# Patient Record
Sex: FEMALE | Race: WHITE | NOT HISPANIC OR LATINO | Employment: UNEMPLOYED | URBAN - METROPOLITAN AREA
[De-identification: names, ages, dates, MRNs, and addresses within clinical notes are randomized per-mention and may not be internally consistent; named-entity substitution may affect disease eponyms.]

---

## 2021-01-27 PROBLEM — R19.7 NOCTURNAL DIARRHEA: Status: ACTIVE | Noted: 2021-01-27

## 2021-01-27 PROBLEM — R10.11 RIGHT UPPER QUADRANT ABDOMINAL PAIN: Status: ACTIVE | Noted: 2021-01-27

## 2021-01-27 PROBLEM — R63.4 UNINTENTIONAL WEIGHT LOSS: Status: ACTIVE | Noted: 2021-01-27

## 2021-01-27 PROBLEM — R19.7 DIARRHEA: Status: ACTIVE | Noted: 2021-01-27

## 2021-07-01 ENCOUNTER — PATIENT MESSAGE (OUTPATIENT)
Dept: ADMINISTRATIVE | Facility: OTHER | Age: 21
End: 2021-07-01

## 2022-11-17 DIAGNOSIS — D64.9 ANEMIA: Primary | ICD-10-CM

## 2022-11-28 ENCOUNTER — OFFICE VISIT (OUTPATIENT)
Dept: HEMATOLOGY/ONCOLOGY | Facility: CLINIC | Age: 22
End: 2022-11-28
Payer: COMMERCIAL

## 2022-11-28 ENCOUNTER — TELEPHONE (OUTPATIENT)
Dept: HEMATOLOGY/ONCOLOGY | Facility: CLINIC | Age: 22
End: 2022-11-28
Payer: MEDICAID

## 2022-11-28 VITALS
SYSTOLIC BLOOD PRESSURE: 116 MMHG | TEMPERATURE: 98 F | WEIGHT: 181 LBS | OXYGEN SATURATION: 98 % | HEIGHT: 64 IN | BODY MASS INDEX: 30.9 KG/M2 | HEART RATE: 74 BPM | DIASTOLIC BLOOD PRESSURE: 80 MMHG

## 2022-11-28 DIAGNOSIS — D50.8 OTHER IRON DEFICIENCY ANEMIA: Primary | ICD-10-CM

## 2022-11-28 DIAGNOSIS — D50.9 IRON DEFICIENCY ANEMIA, UNSPECIFIED IRON DEFICIENCY ANEMIA TYPE: ICD-10-CM

## 2022-11-28 DIAGNOSIS — D50.9 IRON DEFICIENCY ANEMIA, UNSPECIFIED IRON DEFICIENCY ANEMIA TYPE: Primary | ICD-10-CM

## 2022-11-28 DIAGNOSIS — R79.1 ELEVATED PARTIAL THROMBOPLASTIN TIME (PTT): ICD-10-CM

## 2022-11-28 PROBLEM — D64.9 ANEMIA: Status: ACTIVE | Noted: 2022-11-28

## 2022-11-28 LAB
ALBUMIN SERPL-MCNC: 4.3 GM/DL (ref 3.5–5)
ALBUMIN/GLOB SERPL: 1.4 RATIO (ref 1.1–2)
ALP SERPL-CCNC: 83 UNIT/L (ref 40–150)
ALT SERPL-CCNC: 6 UNIT/L (ref 0–55)
APTT PPP: 39.1 SECONDS (ref 23.2–33.7)
AST SERPL-CCNC: 10 UNIT/L (ref 5–34)
BASOPHILS # BLD AUTO: 0.04 X10(3)/MCL (ref 0–0.2)
BASOPHILS NFR BLD AUTO: 0.5 %
BILIRUBIN DIRECT+TOT PNL SERPL-MCNC: 0.3 MG/DL
BUN SERPL-MCNC: 11.3 MG/DL (ref 7–18.7)
CALCIUM SERPL-MCNC: 9.3 MG/DL (ref 8.4–10.2)
CHLORIDE SERPL-SCNC: 107 MMOL/L (ref 98–107)
CLOSURE TME COLL+ADP BLD: 123 SECONDS (ref 46–119)
CLOSURE TME COLL+EPINEP BLD: 162 SECONDS (ref 68–183)
CO2 SERPL-SCNC: 24 MMOL/L (ref 22–29)
CREAT SERPL-MCNC: 0.84 MG/DL (ref 0.55–1.02)
EOSINOPHIL # BLD AUTO: 0.32 X10(3)/MCL (ref 0–0.9)
EOSINOPHIL NFR BLD AUTO: 3.8 %
ERYTHROCYTE [DISTWIDTH] IN BLOOD BY AUTOMATED COUNT: 14.2 % (ref 11.5–17)
FERRITIN SERPL-MCNC: <1.98 NG/ML (ref 4.63–204)
FOLATE SERPL-MCNC: 10.7 NG/ML (ref 7–31.4)
GFR SERPLBLD CREATININE-BSD FMLA CKD-EPI: >60 MLS/MIN/1.73/M2
GLOBULIN SER-MCNC: 3.1 GM/DL (ref 2.4–3.5)
GLUCOSE SERPL-MCNC: 81 MG/DL (ref 74–100)
HCT VFR BLD AUTO: 33.8 % (ref 37–47)
HGB BLD-MCNC: 10.3 GM/DL (ref 12–16)
IMM GRANULOCYTES # BLD AUTO: 0.01 X10(3)/MCL (ref 0–0.04)
IMM GRANULOCYTES NFR BLD AUTO: 0.1 %
INR BLD: 1.01 (ref 0–1.3)
IRON SATN MFR SERPL: 8 % (ref 20–50)
IRON SERPL-MCNC: 32 UG/DL (ref 50–170)
LYMPHOCYTES # BLD AUTO: 3.35 X10(3)/MCL (ref 0.6–4.6)
LYMPHOCYTES NFR BLD AUTO: 39.9 %
MCH RBC QN AUTO: 25.2 PG (ref 27–31)
MCHC RBC AUTO-ENTMCNC: 30.5 MG/DL (ref 33–36)
MCV RBC AUTO: 82.8 FL (ref 80–94)
MONOCYTES # BLD AUTO: 0.4 X10(3)/MCL (ref 0.1–1.3)
MONOCYTES NFR BLD AUTO: 4.8 %
NEUTROPHILS # BLD AUTO: 4.3 X10(3)/MCL (ref 2.1–9.2)
NEUTROPHILS NFR BLD AUTO: 50.9 %
PLATELET # BLD AUTO: 324 X10(3)/MCL (ref 130–400)
PMV BLD AUTO: 8.2 FL (ref 7.4–10.4)
POTASSIUM SERPL-SCNC: 4.2 MMOL/L (ref 3.5–5.1)
PROT SERPL-MCNC: 7.4 GM/DL (ref 6.4–8.3)
PROTHROMBIN TIME: 13.2 SECONDS (ref 12.5–14.5)
RBC # BLD AUTO: 4.08 X10(6)/MCL (ref 4.2–5.4)
RET# (OHS): 0.08 (ref 0.02–0.08)
RETICULOCYTE COUNT AUTOMATED (OLG): 1.89 % (ref 1.1–2.1)
SODIUM SERPL-SCNC: 139 MMOL/L (ref 136–145)
TIBC SERPL-MCNC: 355 UG/DL (ref 70–310)
TIBC SERPL-MCNC: 387 UG/DL (ref 250–450)
VIT B12 SERPL-MCNC: 337 PG/ML (ref 213–816)
WBC # SPEC AUTO: 8.4 X10(3)/MCL (ref 4.5–11.5)

## 2022-11-28 PROCEDURE — 99999 PR PBB SHADOW E&M-EST. PATIENT-LVL IV: ICD-10-PCS | Mod: PBBFAC,,, | Performed by: INTERNAL MEDICINE

## 2022-11-28 PROCEDURE — 1159F MED LIST DOCD IN RCRD: CPT | Mod: CPTII,S$GLB,, | Performed by: INTERNAL MEDICINE

## 2022-11-28 PROCEDURE — 3008F BODY MASS INDEX DOCD: CPT | Mod: CPTII,S$GLB,, | Performed by: INTERNAL MEDICINE

## 2022-11-28 PROCEDURE — 83540 ASSAY OF IRON: CPT | Performed by: INTERNAL MEDICINE

## 2022-11-28 PROCEDURE — 85610 PROTHROMBIN TIME: CPT | Performed by: INTERNAL MEDICINE

## 2022-11-28 PROCEDURE — 3079F DIAST BP 80-89 MM HG: CPT | Mod: CPTII,S$GLB,, | Performed by: INTERNAL MEDICINE

## 2022-11-28 PROCEDURE — 3008F PR BODY MASS INDEX (BMI) DOCUMENTED: ICD-10-PCS | Mod: CPTII,S$GLB,, | Performed by: INTERNAL MEDICINE

## 2022-11-28 PROCEDURE — 36415 COLL VENOUS BLD VENIPUNCTURE: CPT | Performed by: INTERNAL MEDICINE

## 2022-11-28 PROCEDURE — 1159F PR MEDICATION LIST DOCUMENTED IN MEDICAL RECORD: ICD-10-PCS | Mod: CPTII,S$GLB,, | Performed by: INTERNAL MEDICINE

## 2022-11-28 PROCEDURE — 3074F SYST BP LT 130 MM HG: CPT | Mod: CPTII,S$GLB,, | Performed by: INTERNAL MEDICINE

## 2022-11-28 PROCEDURE — 85045 AUTOMATED RETICULOCYTE COUNT: CPT | Performed by: INTERNAL MEDICINE

## 2022-11-28 PROCEDURE — 3074F PR MOST RECENT SYSTOLIC BLOOD PRESSURE < 130 MM HG: ICD-10-PCS | Mod: CPTII,S$GLB,, | Performed by: INTERNAL MEDICINE

## 2022-11-28 PROCEDURE — 99999 PR PBB SHADOW E&M-EST. PATIENT-LVL IV: CPT | Mod: PBBFAC,,, | Performed by: INTERNAL MEDICINE

## 2022-11-28 PROCEDURE — 99204 OFFICE O/P NEW MOD 45 MIN: CPT | Mod: S$GLB,,, | Performed by: INTERNAL MEDICINE

## 2022-11-28 PROCEDURE — 1160F PR REVIEW ALL MEDS BY PRESCRIBER/CLIN PHARMACIST DOCUMENTED: ICD-10-PCS | Mod: CPTII,S$GLB,, | Performed by: INTERNAL MEDICINE

## 2022-11-28 PROCEDURE — 85025 COMPLETE CBC W/AUTO DIFF WBC: CPT | Performed by: INTERNAL MEDICINE

## 2022-11-28 PROCEDURE — 99214 OFFICE O/P EST MOD 30 MIN: CPT | Mod: PBBFAC | Performed by: INTERNAL MEDICINE

## 2022-11-28 PROCEDURE — 3079F PR MOST RECENT DIASTOLIC BLOOD PRESSURE 80-89 MM HG: ICD-10-PCS | Mod: CPTII,S$GLB,, | Performed by: INTERNAL MEDICINE

## 2022-11-28 PROCEDURE — 82746 ASSAY OF FOLIC ACID SERUM: CPT | Performed by: INTERNAL MEDICINE

## 2022-11-28 PROCEDURE — 99204 PR OFFICE/OUTPT VISIT, NEW, LEVL IV, 45-59 MIN: ICD-10-PCS | Mod: S$GLB,,, | Performed by: INTERNAL MEDICINE

## 2022-11-28 PROCEDURE — 80053 COMPREHEN METABOLIC PANEL: CPT | Performed by: INTERNAL MEDICINE

## 2022-11-28 PROCEDURE — 85576 BLOOD PLATELET AGGREGATION: CPT | Performed by: INTERNAL MEDICINE

## 2022-11-28 PROCEDURE — 1160F RVW MEDS BY RX/DR IN RCRD: CPT | Mod: CPTII,S$GLB,, | Performed by: INTERNAL MEDICINE

## 2022-11-28 PROCEDURE — 85730 THROMBOPLASTIN TIME PARTIAL: CPT | Performed by: INTERNAL MEDICINE

## 2022-11-28 PROCEDURE — 82607 VITAMIN B-12: CPT | Performed by: INTERNAL MEDICINE

## 2022-11-28 PROCEDURE — 82728 ASSAY OF FERRITIN: CPT | Performed by: INTERNAL MEDICINE

## 2022-11-28 RX ORDER — SODIUM CHLORIDE 9 MG/ML
INJECTION, SOLUTION INTRAVENOUS CONTINUOUS
Status: CANCELLED | OUTPATIENT
Start: 2022-12-02

## 2022-11-28 RX ORDER — OMEPRAZOLE 40 MG/1
CAPSULE, DELAYED RELEASE ORAL
COMMUNITY

## 2022-11-28 RX ORDER — SODIUM CHLORIDE 0.9 % (FLUSH) 0.9 %
10 SYRINGE (ML) INJECTION
Status: CANCELLED | OUTPATIENT
Start: 2022-12-02

## 2022-11-28 RX ORDER — HEPARIN 100 UNIT/ML
5 SYRINGE INTRAVENOUS
Status: CANCELLED | OUTPATIENT
Start: 2022-12-02

## 2022-11-28 RX ORDER — FLUTICASONE PROPIONATE 50 MCG
2 SPRAY, SUSPENSION (ML) NASAL
COMMUNITY
Start: 2022-09-06 | End: 2023-02-23

## 2022-11-28 NOTE — PROGRESS NOTES
Subjective:        PATIENT: Ceci Abbott  MRN: [id number]  DATE: 2022  Chief Complaint: Follow-up (New Patient)  Referring group:  Nani Antony  Reason for referral anemia     Current Therapy: oral iron and prenatal vit    2022  This is a 22-year-old female who was seen by her primary care team  On laboratory review on 2022 patients have an iron of 27 saturation of 7, a ferritin of 4.8, her hemoglobin was 10, the platelet count of 390, white cell count of 10.  She is noted to have a normal BUN and creatinine.  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  She has dysfunctional uterine bleeding where she is been through multiple different options.  She continues to have PICA for ice, she has chronic heartburn and reflux.  In fact she had a scope when she was younger because of chronic reflux.    She is scheduled to see Gastroenterology.    She continues to breastfeed.  (the baby is 16 months)    Past Medical History:   Diagnosis Date    Depression     Hearing loss in left ear       .  Past Surgical History:   Procedure Laterality Date    ADENOIDECTOMY       SECTION      x2    eardrum reconstruction      SINUS SURGERY      TONSILLECTOMY        .  Family History   Problem Relation Age of Onset    Cancer Mother     Depression Mother     Hypertension Mother     Kidney disease Mother     Miscarriages / Stillbirths Mother     Arthritis Father     Hearing loss Maternal Grandfather     Heart disease Maternal Grandfather     Hypertension Maternal Grandfather     Arthritis Maternal Grandmother     Diabetes Maternal Grandmother     Drug abuse Maternal Grandmother       Social History     Socioeconomic History    Marital status:    Tobacco Use    Smoking  status: Never    Smokeless tobacco: Never   Substance and Sexual Activity    Alcohol use: Never    Drug use: Never    Sexual activity: Yes     Partners: Male     Birth control/protection: OCP      .  Review of patient's allergies indicates:   Allergen Reactions    Shellfish containing products         Current Outpatient Medications:     fluticasone propionate (FLONASE) 50 mcg/actuation nasal spray, 2 sprays by Each Nostril route as needed., Disp: , Rfl:     norethindrone (MICRONOR) 0.35 mg tablet, Take 1 tablet by mouth once daily., Disp: , Rfl:     omeprazole (PRILOSEC) 40 MG capsule, , Disp: , Rfl:     propranoloL (INDERAL) 10 MG tablet, Take 10 mg by mouth once daily., Disp: , Rfl:     sertraline (ZOLOFT) 100 MG tablet, Take 100 mg by mouth once daily., Disp: , Rfl:    Review of Systems   Constitutional:  Negative for appetite change and unexpected weight change.   HENT:  Negative for mouth sores.    Eyes:  Negative for visual disturbance.   Respiratory:  Negative for cough and shortness of breath.    Cardiovascular:  Positive for chest pain.   Gastrointestinal:  Negative for abdominal pain and diarrhea.   Genitourinary:  Negative for frequency.   Musculoskeletal:  Negative for back pain.   Skin:  Negative for rash.   Neurological:  Positive for headaches.   Hematological:  Negative for adenopathy.   Psychiatric/Behavioral:  The patient is not nervous/anxious.         Objective:     Vitals:    11/28/22 1013   BP: 116/80   Pulse: 74   Temp: 98.2 °F (36.8 °C)         Physical Exam  Vitals reviewed.   Constitutional:       General: She is awake.      Appearance: Normal appearance. She is not ill-appearing or diaphoretic.      Comments: Pale   HENT:      Head: Normocephalic and atraumatic.      Right Ear: Tympanic membrane normal.      Left Ear: Tympanic membrane normal.      Mouth/Throat:      Mouth: Mucous membranes are moist.      Pharynx: Oropharynx is clear.   Eyes:      Extraocular Movements: Extraocular  movements intact.      Pupils: Pupils are equal, round, and reactive to light.      Comments: Palor   Cardiovascular:      Rate and Rhythm: Normal rate and regular rhythm.      Pulses: Normal pulses.      Heart sounds: Normal heart sounds.   Pulmonary:      Effort: Pulmonary effort is normal.      Breath sounds: Normal breath sounds and air entry.   Chest:   Breasts:     Right: Normal.      Left: Normal.   Abdominal:      General: Abdomen is flat. Bowel sounds are normal.      Palpations: Abdomen is soft.      Tenderness: There is no abdominal tenderness.   Musculoskeletal:      Cervical back: Normal range of motion.      Right lower leg: No edema.      Left lower leg: No edema.   Lymphadenopathy:      Cervical: No cervical adenopathy.      Upper Body:      Right upper body: No axillary adenopathy.      Left upper body: No axillary adenopathy.      Lower Body: No right inguinal adenopathy. No left inguinal adenopathy.   Skin:     General: Skin is warm and dry.   Neurological:      General: No focal deficit present.      Mental Status: She is alert, oriented to person, place, and time and easily aroused. Mental status is at baseline.   Psychiatric:         Attention and Perception: Attention normal.         Mood and Affect: Mood and affect normal.         Behavior: Behavior is cooperative.       ECOG SCORE    0 - Fully active-able to carry on all pre-disease performance without restriction        .Lab Review:  Outside labs reviewed above in HPI  Assessment/Plan:     Problem List Items Addressed This Visit          Oncology    Anemia    Relevant Orders    CBC Auto Differential    Comprehensive Metabolic Panel    Ferritin    Folate    Iron and TIBC    Reticulocytes    Vitamin B12    Protime-INR    APTT    Platelet Function Assay    Iron deficiency anemia - Primary    Current Assessment & Plan     Iron deficiency: Refractory to prenatal and oral iron     Risk of IV iron infusions discussed --understands to dump her  breast milk, and discussed with gyn,    Continue prenatal vitamin   Discontinue oral iron   Iron rich diet   Agree with GI consultation (she has a follow-up in [place] already scheduled)  Continue follow-up with gyn to control her cycles   Check PT PTT and platelet function studies, if abnormal consider other workup   Labs today CBC, CMP, iron panel, B12 folate, PT PTT, platelet function   Return to clinic in 6-8 weeks with CBC iron panel, ferritin         Relevant Orders    CBC Auto Differential    Comprehensive Metabolic Panel    Ferritin    Folate    Iron and TIBC    Reticulocytes    Vitamin B12    Protime-INR    APTT    Platelet Function Assay         Follow up in about 8 weeks (around 1/23/2023).    Henrik Martínez MD

## 2022-11-28 NOTE — ASSESSMENT & PLAN NOTE
Iron deficiency: Refractory to prenatal and oral iron     Risk of IV iron infusions discussed --understands to dump her breast milk, and discussed with gyn,    Continue prenatal vitamin   Discontinue oral iron   Iron rich diet   Agree with GI consultation (she has a follow-up in [place] already scheduled)  Continue follow-up with gyn to control her cycles   Check PT PTT and platelet function studies, if abnormal consider other workup   Labs today CBC, CMP, iron panel, B12 folate, PT PTT, platelet function   Return to clinic in 6-8 weeks with CBC iron panel, ferritin

## 2022-12-01 ENCOUNTER — PATIENT MESSAGE (OUTPATIENT)
Dept: HEMATOLOGY/ONCOLOGY | Facility: CLINIC | Age: 22
End: 2022-12-01
Payer: MEDICAID

## 2022-12-05 ENCOUNTER — PATIENT MESSAGE (OUTPATIENT)
Dept: HEMATOLOGY/ONCOLOGY | Facility: CLINIC | Age: 22
End: 2022-12-05
Payer: MEDICAID

## 2022-12-05 ENCOUNTER — INFUSION (OUTPATIENT)
Dept: INFUSION THERAPY | Facility: HOSPITAL | Age: 22
End: 2022-12-05
Attending: INTERNAL MEDICINE
Payer: COMMERCIAL

## 2022-12-05 VITALS
OXYGEN SATURATION: 99 % | DIASTOLIC BLOOD PRESSURE: 67 MMHG | SYSTOLIC BLOOD PRESSURE: 122 MMHG | HEART RATE: 70 BPM | TEMPERATURE: 98 F | RESPIRATION RATE: 18 BRPM

## 2022-12-05 DIAGNOSIS — D50.9 IRON DEFICIENCY ANEMIA, UNSPECIFIED IRON DEFICIENCY ANEMIA TYPE: Primary | ICD-10-CM

## 2022-12-05 PROCEDURE — 96365 THER/PROPH/DIAG IV INF INIT: CPT

## 2022-12-05 PROCEDURE — 25000003 PHARM REV CODE 250: Performed by: INTERNAL MEDICINE

## 2022-12-05 PROCEDURE — 96366 THER/PROPH/DIAG IV INF ADDON: CPT

## 2022-12-05 PROCEDURE — 96367 TX/PROPH/DG ADDL SEQ IV INF: CPT

## 2022-12-05 PROCEDURE — 63600175 PHARM REV CODE 636 W HCPCS: Performed by: INTERNAL MEDICINE

## 2022-12-05 PROCEDURE — 96375 TX/PRO/DX INJ NEW DRUG ADDON: CPT

## 2022-12-05 RX ORDER — METHYLPREDNISOLONE SOD SUCC 125 MG
60 VIAL (EA) INJECTION
Status: CANCELLED
Start: 2022-12-05

## 2022-12-05 RX ORDER — DIPHENHYDRAMINE HYDROCHLORIDE 50 MG/ML
12.5 INJECTION INTRAMUSCULAR; INTRAVENOUS
Status: CANCELLED
Start: 2022-12-05

## 2022-12-05 RX ORDER — DIPHENHYDRAMINE HYDROCHLORIDE 50 MG/ML
12.5 INJECTION INTRAMUSCULAR; INTRAVENOUS
Status: CANCELLED
Start: 2022-12-12

## 2022-12-05 RX ORDER — SODIUM CHLORIDE 0.9 % (FLUSH) 0.9 %
10 SYRINGE (ML) INJECTION
Status: DISCONTINUED | OUTPATIENT
Start: 2022-12-05 | End: 2022-12-05 | Stop reason: HOSPADM

## 2022-12-05 RX ORDER — HEPARIN 100 UNIT/ML
5 SYRINGE INTRAVENOUS
Status: DISCONTINUED | OUTPATIENT
Start: 2022-12-05 | End: 2022-12-05 | Stop reason: HOSPADM

## 2022-12-05 RX ORDER — SODIUM CHLORIDE 9 MG/ML
INJECTION, SOLUTION INTRAVENOUS CONTINUOUS
Status: CANCELLED | OUTPATIENT
Start: 2022-12-12

## 2022-12-05 RX ORDER — HEPARIN 100 UNIT/ML
5 SYRINGE INTRAVENOUS
Status: CANCELLED | OUTPATIENT
Start: 2022-12-12

## 2022-12-05 RX ORDER — SODIUM CHLORIDE 9 MG/ML
INJECTION, SOLUTION INTRAVENOUS CONTINUOUS
Status: DISCONTINUED | OUTPATIENT
Start: 2022-12-05 | End: 2022-12-05 | Stop reason: HOSPADM

## 2022-12-05 RX ORDER — DIPHENHYDRAMINE HYDROCHLORIDE 50 MG/ML
12.5 INJECTION INTRAMUSCULAR; INTRAVENOUS ONCE
Status: COMPLETED | OUTPATIENT
Start: 2022-12-05 | End: 2022-12-05

## 2022-12-05 RX ORDER — SODIUM CHLORIDE 0.9 % (FLUSH) 0.9 %
10 SYRINGE (ML) INJECTION
Status: CANCELLED | OUTPATIENT
Start: 2022-12-12

## 2022-12-05 RX ORDER — METHYLPREDNISOLONE SOD SUCC 125 MG
60 VIAL (EA) INJECTION
Status: CANCELLED
Start: 2022-12-12

## 2022-12-05 RX ADMIN — DIPHENHYDRAMINE HYDROCHLORIDE 12.5 MG: 50 INJECTION, SOLUTION INTRAMUSCULAR; INTRAVENOUS at 01:12

## 2022-12-05 RX ADMIN — METHYLPREDNISOLONE SODIUM SUCCINATE 60 MG: 40 INJECTION, POWDER, FOR SOLUTION INTRAMUSCULAR; INTRAVENOUS at 01:12

## 2022-12-05 RX ADMIN — FERRIC CARBOXYMALTOSE INJECTION 750 MG: 50 INJECTION, SOLUTION INTRAVENOUS at 01:12

## 2022-12-05 NOTE — PROGRESS NOTES
Patient was doing her infusion.  She just started, she then some chest tightness and feeling unwell.    Infusion was stopped.  On exam there is no hives, no rash, no stridor or wheezing.    She was given Solu-Medrol and given Benadryl.  And will see how her symptoms resolve.  If her symptoms resolve will start the infusion back.  If they reoccur, then will have to change her to another medication.    Would also repeat the same premeds with next infusion if she tolerates the above

## 2022-12-05 NOTE — NURSING
C/o chest tightness when injectafer started.  V/s : 122/67, 70, 99% o2 sat.  Infusion stopped.  Dr Martínez notified.  Srini 12.5mg ivp and solumedrol 60mg ivp given as ordered.  Chest tightness resolved.  Injectafer restarted as ordered.  Nedra well.  Pre-meds added to 2nd infusion next week.  Verb understanding.

## 2022-12-12 ENCOUNTER — INFUSION (OUTPATIENT)
Dept: INFUSION THERAPY | Facility: HOSPITAL | Age: 22
End: 2022-12-12
Attending: INTERNAL MEDICINE
Payer: COMMERCIAL

## 2022-12-12 VITALS
HEART RATE: 96 BPM | RESPIRATION RATE: 18 BRPM | DIASTOLIC BLOOD PRESSURE: 80 MMHG | TEMPERATURE: 98 F | SYSTOLIC BLOOD PRESSURE: 131 MMHG

## 2022-12-12 DIAGNOSIS — D50.9 IRON DEFICIENCY ANEMIA, UNSPECIFIED IRON DEFICIENCY ANEMIA TYPE: Primary | ICD-10-CM

## 2022-12-12 LAB
BASOPHILS # BLD AUTO: 0.05 X10(3)/MCL (ref 0–0.2)
BASOPHILS NFR BLD AUTO: 0.6 %
EOSINOPHIL # BLD AUTO: 0.22 X10(3)/MCL (ref 0–0.9)
EOSINOPHIL NFR BLD AUTO: 2.5 %
ERYTHROCYTE [DISTWIDTH] IN BLOOD BY AUTOMATED COUNT: 15.6 % (ref 11.5–17)
HCT VFR BLD AUTO: 34.9 % (ref 37–47)
HGB BLD-MCNC: 10.5 GM/DL (ref 12–16)
IMM GRANULOCYTES # BLD AUTO: 0.05 X10(3)/MCL (ref 0–0.04)
IMM GRANULOCYTES NFR BLD AUTO: 0.6 %
LYMPHOCYTES # BLD AUTO: 2.3 X10(3)/MCL (ref 0.6–4.6)
LYMPHOCYTES NFR BLD AUTO: 26.3 %
MCH RBC QN AUTO: 25.5 PG (ref 27–31)
MCHC RBC AUTO-ENTMCNC: 30.1 MG/DL (ref 33–36)
MCV RBC AUTO: 84.7 FL (ref 80–94)
MONOCYTES # BLD AUTO: 0.27 X10(3)/MCL (ref 0.1–1.3)
MONOCYTES NFR BLD AUTO: 3.1 %
NEUTROPHILS # BLD AUTO: 5.9 X10(3)/MCL (ref 2.1–9.2)
NEUTROPHILS NFR BLD AUTO: 66.9 %
PLATELET # BLD AUTO: 356 X10(3)/MCL (ref 130–400)
PMV BLD AUTO: 8.9 FL (ref 7.4–10.4)
RBC # BLD AUTO: 4.12 X10(6)/MCL (ref 4.2–5.4)
WBC # SPEC AUTO: 8.8 X10(3)/MCL (ref 4.5–11.5)

## 2022-12-12 PROCEDURE — 63600175 PHARM REV CODE 636 W HCPCS: Performed by: INTERNAL MEDICINE

## 2022-12-12 PROCEDURE — 96365 THER/PROPH/DIAG IV INF INIT: CPT

## 2022-12-12 PROCEDURE — 96375 TX/PRO/DX INJ NEW DRUG ADDON: CPT

## 2022-12-12 PROCEDURE — 25000003 PHARM REV CODE 250: Performed by: NURSE PRACTITIONER

## 2022-12-12 PROCEDURE — 63600175 PHARM REV CODE 636 W HCPCS: Performed by: NURSE PRACTITIONER

## 2022-12-12 PROCEDURE — 25000003 PHARM REV CODE 250: Performed by: INTERNAL MEDICINE

## 2022-12-12 PROCEDURE — 36415 COLL VENOUS BLD VENIPUNCTURE: CPT | Performed by: INTERNAL MEDICINE

## 2022-12-12 PROCEDURE — 96361 HYDRATE IV INFUSION ADD-ON: CPT

## 2022-12-12 PROCEDURE — 96376 TX/PRO/DX INJ SAME DRUG ADON: CPT

## 2022-12-12 PROCEDURE — 85025 COMPLETE CBC W/AUTO DIFF WBC: CPT | Performed by: INTERNAL MEDICINE

## 2022-12-12 RX ORDER — DIPHENHYDRAMINE HCL 25 MG
25 CAPSULE ORAL EVERY 12 HOURS
Qty: 6 CAPSULE | Refills: 0 | Status: SHIPPED | OUTPATIENT
Start: 2022-12-12 | End: 2022-12-15

## 2022-12-12 RX ORDER — HEPARIN 100 UNIT/ML
5 SYRINGE INTRAVENOUS
Status: CANCELLED | OUTPATIENT
Start: 2022-12-12

## 2022-12-12 RX ORDER — SODIUM CHLORIDE 9 MG/ML
INJECTION, SOLUTION INTRAVENOUS CONTINUOUS
Status: DISCONTINUED | OUTPATIENT
Start: 2022-12-12 | End: 2022-12-12 | Stop reason: HOSPADM

## 2022-12-12 RX ORDER — SODIUM CHLORIDE 0.9 % (FLUSH) 0.9 %
10 SYRINGE (ML) INJECTION
Status: DISCONTINUED | OUTPATIENT
Start: 2022-12-12 | End: 2022-12-12 | Stop reason: HOSPADM

## 2022-12-12 RX ORDER — DIPHENHYDRAMINE HYDROCHLORIDE 50 MG/ML
25 INJECTION INTRAMUSCULAR; INTRAVENOUS
Status: COMPLETED | OUTPATIENT
Start: 2022-12-12 | End: 2022-12-12

## 2022-12-12 RX ORDER — ONDANSETRON 2 MG/ML
8 INJECTION INTRAMUSCULAR; INTRAVENOUS ONCE
Status: COMPLETED | OUTPATIENT
Start: 2022-12-12 | End: 2022-12-12

## 2022-12-12 RX ORDER — DIPHENHYDRAMINE HYDROCHLORIDE 50 MG/ML
12.5 INJECTION INTRAMUSCULAR; INTRAVENOUS
Status: COMPLETED | OUTPATIENT
Start: 2022-12-12 | End: 2022-12-12

## 2022-12-12 RX ORDER — SODIUM CHLORIDE 0.9 % (FLUSH) 0.9 %
10 SYRINGE (ML) INJECTION
Status: CANCELLED | OUTPATIENT
Start: 2022-12-12

## 2022-12-12 RX ORDER — FAMOTIDINE 10 MG/ML
20 INJECTION INTRAVENOUS
Status: COMPLETED | OUTPATIENT
Start: 2022-12-12 | End: 2022-12-12

## 2022-12-12 RX ORDER — HEPARIN 100 UNIT/ML
5 SYRINGE INTRAVENOUS
Status: DISCONTINUED | OUTPATIENT
Start: 2022-12-12 | End: 2022-12-12 | Stop reason: HOSPADM

## 2022-12-12 RX ORDER — PREDNISONE 20 MG/1
20 TABLET ORAL 2 TIMES DAILY
Qty: 6 TABLET | Refills: 0 | Status: SHIPPED | OUTPATIENT
Start: 2022-12-12 | End: 2022-12-15

## 2022-12-12 RX ORDER — DIPHENHYDRAMINE HYDROCHLORIDE 50 MG/ML
12.5 INJECTION INTRAMUSCULAR; INTRAVENOUS
Status: CANCELLED
Start: 2022-12-12

## 2022-12-12 RX ORDER — METHYLPREDNISOLONE SOD SUCC 125 MG
60 VIAL (EA) INJECTION
Status: COMPLETED | OUTPATIENT
Start: 2022-12-12 | End: 2022-12-12

## 2022-12-12 RX ORDER — METHYLPREDNISOLONE SOD SUCC 125 MG
60 VIAL (EA) INJECTION
Status: CANCELLED
Start: 2022-12-12

## 2022-12-12 RX ORDER — FAMOTIDINE 20 MG/1
20 TABLET, FILM COATED ORAL 2 TIMES DAILY
Qty: 6 TABLET | Refills: 0 | Status: SHIPPED | OUTPATIENT
Start: 2022-12-12 | End: 2022-12-21

## 2022-12-12 RX ORDER — SODIUM CHLORIDE 9 MG/ML
INJECTION, SOLUTION INTRAVENOUS CONTINUOUS
Status: CANCELLED | OUTPATIENT
Start: 2022-12-12

## 2022-12-12 RX ADMIN — ONDANSETRON 8 MG: 2 INJECTION INTRAMUSCULAR; INTRAVENOUS at 11:12

## 2022-12-12 RX ADMIN — FERRIC CARBOXYMALTOSE INJECTION 750 MG: 50 INJECTION, SOLUTION INTRAVENOUS at 12:12

## 2022-12-12 RX ADMIN — SODIUM CHLORIDE: 9 INJECTION, SOLUTION INTRAVENOUS at 12:12

## 2022-12-12 RX ADMIN — FAMOTIDINE 20 MG: 10 INJECTION, SOLUTION INTRAVENOUS at 12:12

## 2022-12-12 RX ADMIN — DIPHENHYDRAMINE HYDROCHLORIDE 12.5 MG: 50 INJECTION, SOLUTION INTRAMUSCULAR; INTRAVENOUS at 11:12

## 2022-12-12 RX ADMIN — DIPHENHYDRAMINE HYDROCHLORIDE 25 MG: 50 INJECTION, SOLUTION INTRAMUSCULAR; INTRAVENOUS at 12:12

## 2022-12-12 RX ADMIN — METHYLPREDNISOLONE SODIUM SUCCINATE 60 MG: 125 INJECTION, POWDER, FOR SOLUTION INTRAMUSCULAR; INTRAVENOUS at 11:12

## 2022-12-12 NOTE — NURSING
C/o chest pain with injectafer, pepcid 20mg iv and benadryl 25mg iv orderd.  Still c/o mild chest pain.  Dr. Martínez notified.  Orthostatic v/s done as ordered: (-) 118/74, 77, (L) 120/76, 96, (I) 134/81, 103.  Stat cbc drawn as ordered.  Hgb 10.5.  Injectafer not re-started per Dr. Martínez's orderes.  Ivf's given.  D/c to home.  Will Rtc next week for lab/dr visit.  Instructed to call if any problems.  Verb understanding.

## 2022-12-12 NOTE — CARE UPDATE
Patient was given premeds prior to her iron infusion.    During the infusion she started having some more chest discomfort difficulty catching a full breath.    She was given more Benadryl and some Pepcid.  Some Zofran.    She was seen on exam there is no stridor there is no wheezing this respiratory distress is no tachycardia, patient reports that she is felt unwell all week even since her iron infusion.  Back 1 time she wanted to go the emergency room but did not.  She has worsening in her ears and discomfort.  Still has PICA for ice.    Orthostatics will be done and a stat CBC and will follow.      Addendum:  Patient is sitting comfortably, she ate some chips and drink Coke.    Symptoms have pretty much abated  Lab Review:  CBC:   Recent Labs     12/12/22  1337 11/28/22  1135   WBC 8.8 8.4   RBC 4.12* 4.08*   HGB 10.5* 10.3*   HCT 34.9* 33.8*    324        Infusion on 12/12/2022   Component Date Value Ref Range Status    WBC 12/12/2022 8.8  4.5 - 11.5 x10(3)/mcL Final    RBC 12/12/2022 4.12 (L)  4.20 - 5.40 x10(6)/mcL Final    Hgb 12/12/2022 10.5 (L)  12.0 - 16.0 gm/dL Final    Hct 12/12/2022 34.9 (L)  37.0 - 47.0 % Final    MCV 12/12/2022 84.7  80.0 - 94.0 fL Final    MCH 12/12/2022 25.5 (L)  27.0 - 31.0 pg Final    MCHC 12/12/2022 30.1 (L)  33.0 - 36.0 mg/dL Final    RDW 12/12/2022 15.6  11.5 - 17.0 % Final    Platelet 12/12/2022 356  130 - 400 x10(3)/mcL Final    MPV 12/12/2022 8.9  7.4 - 10.4 fL Final    Neut % 12/12/2022 66.9  % Final    Lymph % 12/12/2022 26.3  % Final    Mono % 12/12/2022 3.1  % Final    Eos % 12/12/2022 2.5  % Final    Basophil % 12/12/2022 0.6  % Final    Lymph # 12/12/2022 2.30  0.6 - 4.6 x10(3)/mcL Final    Neut # 12/12/2022 5.9  2.1 - 9.2 x10(3)/mcL Final    Mono # 12/12/2022 0.27  0.1 - 1.3 x10(3)/mcL Final    Eos # 12/12/2022 0.22  0 - 0.9 x10(3)/mcL Final    Baso # 12/12/2022 0.05  0 - 0.2 x10(3)/mcL Final    IG# 12/12/2022 0.05 (H)  0 - 0.04 x10(3)/mcL Final    IG%  12/12/2022 0.6  % Final     Lying 118/74 pulse 77   Sitting 120/76 pulse 96   Standing 134/81 pulse of 103    DC this IV iron   Patient to take Benadryl b.i.d. x3 days, , Pepcid p.o. b.i.d. x3 days prednisone 20 mg p.o. b.i.d. x3 days  Return to clinic with repeat CBC iron panel and ferritin  Consider Ferrlecit or Venofer

## 2022-12-20 NOTE — PROGRESS NOTES
Subjective:        PATIENT: Ceci VAUGHAN (JM)  MRN: [id number]  DATE: 2022  Chief Complaint: Follow-up (8 week f/u. Says that she doesn't feel any better, she's fatigued today. Also that she can not walk from one side of her house to another without getting SOB)    Referring group:  Nani Antony  Reason for referral anemia     Current Therapy: oral iron and prenatal vit                 S/p one dose of injectafer--reaction    2022  This is a 22-year-old female who was seen by her primary care team  On laboratory review on 2022 patients have an iron of 27 saturation of 7, a ferritin of 4.8, her hemoglobin was 10, the platelet count of 390, white cell count of 10.  She is noted to have a normal BUN and creatinine.  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  She has dysfunctional uterine bleeding where she is been through multiple different options.  She continues to have PICA for ice, she has chronic heartburn and reflux.  In fact she had a scope when she was younger because of chronic reflux.    She is scheduled to see Gastroenterology.    She continues to breastfeed.  (the baby is 16 months)      Status post 1 dose of IV iron, attempted 2nd dose of IV iron continued to have chest pain and shortness of breath, hemoglobin improved after 1 dose,      Today--22:  Still complains of fatigue and dyspnea.  In fact the patient reports she had a bout of palpitations with a heart rate gone up to 140 on her fit bit the other day.  She had the IV iron, her hemoglobin is improved to 11.4 today.    Past Medical History:   Diagnosis Date    Depression     Hearing loss in left ear       .  Past Surgical History:   Procedure Laterality Date    ADENOIDECTOMY        SECTION      x2    eardrum reconstruction      SINUS SURGERY      TONSILLECTOMY        .  Family History   Problem Relation Age of Onset    Cancer Mother     Depression Mother     Hypertension Mother     Kidney disease Mother     Miscarriages / Stillbirths Mother     Arthritis Father     Hearing loss Maternal Grandfather     Heart disease Maternal Grandfather     Hypertension Maternal Grandfather     Arthritis Maternal Grandmother     Diabetes Maternal Grandmother     Drug abuse Maternal Grandmother       Social History     Socioeconomic History    Marital status:    Tobacco Use    Smoking status: Never    Smokeless tobacco: Never   Substance and Sexual Activity    Alcohol use: Never    Drug use: Never    Sexual activity: Yes     Partners: Male     Birth control/protection: OCP      .  Review of patient's allergies indicates:   Allergen Reactions    Shellfish containing products         Current Outpatient Medications:     norethindrone (MICRONOR) 0.35 mg tablet, Take 1 tablet by mouth once daily., Disp: , Rfl:     omeprazole (PRILOSEC) 40 MG capsule, , Disp: , Rfl:     ondansetron (ZOFRAN-ODT) 8 MG TbDL, DISSOLVE ONE TABLET ON THE TONGUE EVERY TWELVE HOURS FOR 3 DAYS, Disp: , Rfl:     propranoloL (INDERAL) 10 MG tablet, Take 10 mg by mouth once daily., Disp: , Rfl:     sertraline (ZOLOFT) 100 MG tablet, Take 100 mg by mouth once daily., Disp: , Rfl:     fluticasone propionate (FLONASE) 50 mcg/actuation nasal spray, 2 sprays by Each Nostril route as needed., Disp: , Rfl:    Review of Systems   Constitutional:  Negative for appetite change and unexpected weight change.   HENT:  Negative for mouth sores.    Eyes:  Negative for visual disturbance.   Respiratory:  Negative for cough and shortness of breath.    Cardiovascular:  Positive for chest pain.   Gastrointestinal:  Negative for abdominal pain and diarrhea.   Genitourinary:  Negative for frequency.   Musculoskeletal:  Negative for back pain.    Skin:  Negative for rash.   Neurological:  Positive for headaches.   Hematological:  Negative for adenopathy.   Psychiatric/Behavioral:  The patient is not nervous/anxious.         Objective:     Vitals:    12/21/22 1115   BP: 109/67   Pulse: 77   Temp: 98.2 °F (36.8 °C)           Physical Exam  Vitals reviewed.   Constitutional:       General: She is awake.      Appearance: Normal appearance. She is not ill-appearing or diaphoretic.      Comments: Pale   HENT:      Head: Normocephalic and atraumatic.      Right Ear: Tympanic membrane normal.      Left Ear: Tympanic membrane normal.      Mouth/Throat:      Mouth: Mucous membranes are moist.      Pharynx: Oropharynx is clear.   Eyes:      Extraocular Movements: Extraocular movements intact.      Pupils: Pupils are equal, round, and reactive to light.      Comments: Palor   Cardiovascular:      Rate and Rhythm: Normal rate and regular rhythm.      Pulses: Normal pulses.      Heart sounds: Normal heart sounds.   Pulmonary:      Effort: Pulmonary effort is normal.      Breath sounds: Normal breath sounds and air entry.   Chest:   Breasts:     Right: Normal.      Left: Normal.   Abdominal:      General: Abdomen is flat. Bowel sounds are normal.      Palpations: Abdomen is soft.      Tenderness: There is no abdominal tenderness.   Musculoskeletal:      Cervical back: Normal range of motion.      Right lower leg: No edema.      Left lower leg: No edema.   Lymphadenopathy:      Cervical: No cervical adenopathy.      Upper Body:      Right upper body: No axillary adenopathy.      Left upper body: No axillary adenopathy.      Lower Body: No right inguinal adenopathy. No left inguinal adenopathy.   Skin:     General: Skin is warm and dry.   Neurological:      General: No focal deficit present.      Mental Status: She is alert, oriented to person, place, and time and easily aroused. Mental status is at baseline.   Psychiatric:         Attention and Perception: Attention  normal.         Mood and Affect: Mood and affect normal.         Behavior: Behavior is cooperative.       ECOG SCORE    0 - Fully active-able to carry on all pre-disease performance without restriction        .Lab Review:  White count 8, hemoglobin 11.4, hematocrit 37, platelets 283       Outside labs reviewed above in HPI  Assessment/Plan:     Problem List Items Addressed This Visit          Cardiac/Vascular    Intermittent palpitations    Relevant Orders    Ambulatory referral/consult to Cardiology       Oncology    Iron deficiency anemia - Primary    Current Assessment & Plan     Iron deficiency: Refractory to prenatal and oral iron   Now status post 1 dose of IV iron with a hemoglobin improved to 11.4.  She did have a reaction to the Injectafer and a 2nd reaction  On 2nd dose despite premedications    Plan  Continue prenatal vitamin   Discontinue oral iron   Iron rich diet   Agree with GI consultation (she has a follow-up in [place] already scheduled)  Continue follow-up with gyn to control her cycles       IV venofer or ferrelicit   if need IV iron again and ferritin less than 25  Premed 650 tylenol  If repeat PTT elevated--check mixing and VWF  Return to clinic in 8 weeks with CBC iron panel, ferritin         Relevant Orders    CBC Auto Differential    Iron and TIBC    Ferritin         Follow up in about 8 weeks (around 2/15/2023) for with cbc, iron, ferritin, with Dr. Martínez.    Henrik Martínez MD

## 2022-12-20 NOTE — ASSESSMENT & PLAN NOTE
Iron deficiency: Refractory to prenatal and oral iron   Now status post 1 dose of IV iron with a hemoglobin improved to 11.4.  She did have a reaction to the Injectafer and a 2nd reaction  On 2nd dose despite premedications    Plan  Continue prenatal vitamin   Discontinue oral iron   Iron rich diet   Agree with GI consultation (she has a follow-up in [place] already scheduled)  Continue follow-up with gyn to control her cycles       IV venofer or ferrelicit   if need IV iron again and ferritin less than 25  Premed 650 tylenol  If repeat PTT elevated--check mixing and VWF  Return to clinic in 8 weeks with CBC iron panel, ferritin

## 2022-12-21 ENCOUNTER — PATIENT MESSAGE (OUTPATIENT)
Dept: HEMATOLOGY/ONCOLOGY | Facility: CLINIC | Age: 22
End: 2022-12-21

## 2022-12-21 ENCOUNTER — OFFICE VISIT (OUTPATIENT)
Dept: HEMATOLOGY/ONCOLOGY | Facility: CLINIC | Age: 22
End: 2022-12-21
Payer: COMMERCIAL

## 2022-12-21 VITALS
BODY MASS INDEX: 30.53 KG/M2 | HEART RATE: 77 BPM | SYSTOLIC BLOOD PRESSURE: 109 MMHG | DIASTOLIC BLOOD PRESSURE: 67 MMHG | HEIGHT: 64 IN | OXYGEN SATURATION: 100 % | WEIGHT: 178.81 LBS | TEMPERATURE: 98 F

## 2022-12-21 DIAGNOSIS — R00.2 INTERMITTENT PALPITATIONS: ICD-10-CM

## 2022-12-21 DIAGNOSIS — D50.9 IRON DEFICIENCY ANEMIA, UNSPECIFIED IRON DEFICIENCY ANEMIA TYPE: ICD-10-CM

## 2022-12-21 DIAGNOSIS — D50.8 OTHER IRON DEFICIENCY ANEMIA: Primary | ICD-10-CM

## 2022-12-21 DIAGNOSIS — R79.1 ELEVATED PARTIAL THROMBOPLASTIN TIME (PTT): Primary | ICD-10-CM

## 2022-12-21 PROCEDURE — 3078F DIAST BP <80 MM HG: CPT | Mod: CPTII,S$GLB,, | Performed by: INTERNAL MEDICINE

## 2022-12-21 PROCEDURE — 99214 PR OFFICE/OUTPT VISIT, EST, LEVL IV, 30-39 MIN: ICD-10-PCS | Mod: S$GLB,,, | Performed by: INTERNAL MEDICINE

## 2022-12-21 PROCEDURE — 3074F PR MOST RECENT SYSTOLIC BLOOD PRESSURE < 130 MM HG: ICD-10-PCS | Mod: CPTII,S$GLB,, | Performed by: INTERNAL MEDICINE

## 2022-12-21 PROCEDURE — 3008F BODY MASS INDEX DOCD: CPT | Mod: CPTII,S$GLB,, | Performed by: INTERNAL MEDICINE

## 2022-12-21 PROCEDURE — 1160F RVW MEDS BY RX/DR IN RCRD: CPT | Mod: CPTII,S$GLB,, | Performed by: INTERNAL MEDICINE

## 2022-12-21 PROCEDURE — 1159F PR MEDICATION LIST DOCUMENTED IN MEDICAL RECORD: ICD-10-PCS | Mod: CPTII,S$GLB,, | Performed by: INTERNAL MEDICINE

## 2022-12-21 PROCEDURE — 99214 OFFICE O/P EST MOD 30 MIN: CPT | Mod: S$GLB,,, | Performed by: INTERNAL MEDICINE

## 2022-12-21 PROCEDURE — 3078F PR MOST RECENT DIASTOLIC BLOOD PRESSURE < 80 MM HG: ICD-10-PCS | Mod: CPTII,S$GLB,, | Performed by: INTERNAL MEDICINE

## 2022-12-21 PROCEDURE — 99999 PR PBB SHADOW E&M-EST. PATIENT-LVL IV: ICD-10-PCS | Mod: PBBFAC,,, | Performed by: INTERNAL MEDICINE

## 2022-12-21 PROCEDURE — 99999 PR PBB SHADOW E&M-EST. PATIENT-LVL IV: CPT | Mod: PBBFAC,,, | Performed by: INTERNAL MEDICINE

## 2022-12-21 PROCEDURE — 3074F SYST BP LT 130 MM HG: CPT | Mod: CPTII,S$GLB,, | Performed by: INTERNAL MEDICINE

## 2022-12-21 PROCEDURE — 1159F MED LIST DOCD IN RCRD: CPT | Mod: CPTII,S$GLB,, | Performed by: INTERNAL MEDICINE

## 2022-12-21 PROCEDURE — 99214 OFFICE O/P EST MOD 30 MIN: CPT | Mod: PBBFAC | Performed by: INTERNAL MEDICINE

## 2022-12-21 PROCEDURE — 3008F PR BODY MASS INDEX (BMI) DOCUMENTED: ICD-10-PCS | Mod: CPTII,S$GLB,, | Performed by: INTERNAL MEDICINE

## 2022-12-21 PROCEDURE — 1160F PR REVIEW ALL MEDS BY PRESCRIBER/CLIN PHARMACIST DOCUMENTED: ICD-10-PCS | Mod: CPTII,S$GLB,, | Performed by: INTERNAL MEDICINE

## 2022-12-21 RX ORDER — ONDANSETRON 8 MG/1
TABLET, ORALLY DISINTEGRATING ORAL
COMMUNITY
Start: 2022-12-06 | End: 2023-02-23

## 2023-03-20 NOTE — PROGRESS NOTES
Subjective:        PATIENT: Ceci Abbott  MRN: [id number]  DATE: 3/27/2023  Chief Complaint: Follow-up (8 week f/u)    Referring group:  Nani Antony  Reason for referral anemia     Current Therapy: oral iron and prenatal vit                 S/p one dose of injectafer--reaction      This is a 22-year-old female who was seen by her primary care team  On laboratory review on 2022 patients have an iron of 27 saturation of 7, a ferritin of 4.8, her hemoglobin was 10, the platelet count of 390, white cell count of 10.  She is noted to have a normal BUN and creatinine.  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  She has dysfunctional uterine bleeding where she is been through multiple different options.  She continues to have PICA for ice, she has chronic heartburn and reflux.  In fact she had a scope when she was younger because of chronic reflux.    She is scheduled to see Gastroenterology.    She continues to breastfeed.  (the baby is 16 months)      Status post 1 dose of IV iron, attempted 2nd dose of IV iron continued to have chest pain and shortness of breath, hemoglobin improved after 1 dose,    2023  Patient presents for 8 week follow up. She had gyn d&c and exploratory lap due to bleeding. She states polyps removed and she's had no bleeding since. She also had a visit with GI and has an abdominal us scheduled. Blood work drawn today CBC, iron panel, ferritin still pending. She is not taking a prenatal vitamin at this time.     Past Medical History:   Diagnosis Date    Depression     Hearing loss in left ear       .  Past Surgical History:   Procedure Laterality Date    ADENOIDECTOMY       SECTION      x2    eardrum reconstruction      SINUS SURGERY       TONSILLECTOMY        .  Family History   Problem Relation Age of Onset    Cancer Mother     Depression Mother     Hypertension Mother     Kidney disease Mother     Miscarriages / Stillbirths Mother     Arthritis Father     Hearing loss Maternal Grandfather     Heart disease Maternal Grandfather     Hypertension Maternal Grandfather     Arthritis Maternal Grandmother     Diabetes Maternal Grandmother     Drug abuse Maternal Grandmother       Social History     Socioeconomic History    Marital status:    Tobacco Use    Smoking status: Never    Smokeless tobacco: Never   Substance and Sexual Activity    Alcohol use: Never    Drug use: Never    Sexual activity: Yes     Partners: Male     Birth control/protection: OCP      .  Review of patient's allergies indicates:   Allergen Reactions    Injectafer [ferric carboxymaltose] Shortness Of Breath and Palpitations    Shellfish containing products         Current Outpatient Medications:     norethindrone (MICRONOR) 0.35 mg tablet, Take 1 tablet by mouth once daily., Disp: , Rfl:     omeprazole (PRILOSEC) 40 MG capsule, , Disp: , Rfl:     propranoloL (INDERAL) 10 MG tablet, Take 10 mg by mouth once daily., Disp: , Rfl:     sertraline (ZOLOFT) 100 MG tablet, Take 100 mg by mouth once daily., Disp: , Rfl:    Review of Systems   Constitutional:  Negative for appetite change and unexpected weight change.   HENT:  Negative for mouth sores.    Eyes:  Negative for visual disturbance.   Respiratory:  Negative for cough and shortness of breath.    Cardiovascular:  Positive for chest pain.   Gastrointestinal:  Negative for abdominal pain and diarrhea.   Genitourinary:  Negative for frequency.   Musculoskeletal:  Negative for back pain.   Skin:  Negative for rash.   Neurological:  Positive for headaches.   Hematological:  Negative for adenopathy.   Psychiatric/Behavioral:  The patient is not nervous/anxious.         Objective:     Vitals:    03/27/23 0852   BP: 111/75   Pulse:  70   Temp: 97.8 °F (36.6 °C)             Physical Exam  Vitals reviewed.   Constitutional:       General: She is awake.      Appearance: Normal appearance. She is not ill-appearing or diaphoretic.      Comments: Pale   HENT:      Head: Normocephalic and atraumatic.      Right Ear: Tympanic membrane normal.      Left Ear: Tympanic membrane normal.      Mouth/Throat:      Mouth: Mucous membranes are moist.      Pharynx: Oropharynx is clear.   Eyes:      Extraocular Movements: Extraocular movements intact.      Pupils: Pupils are equal, round, and reactive to light.      Comments: Palor   Cardiovascular:      Rate and Rhythm: Normal rate and regular rhythm.      Pulses: Normal pulses.      Heart sounds: Normal heart sounds.   Pulmonary:      Effort: Pulmonary effort is normal.      Breath sounds: Normal breath sounds and air entry.   Chest:   Breasts:     Right: Normal.      Left: Normal.   Abdominal:      General: Abdomen is flat. Bowel sounds are normal.      Palpations: Abdomen is soft.      Tenderness: There is no abdominal tenderness.   Musculoskeletal:      Cervical back: Normal range of motion.      Right lower leg: No edema.      Left lower leg: No edema.   Lymphadenopathy:      Cervical: No cervical adenopathy.      Upper Body:      Right upper body: No axillary adenopathy.      Left upper body: No axillary adenopathy.      Lower Body: No right inguinal adenopathy. No left inguinal adenopathy.   Skin:     General: Skin is warm and dry.   Neurological:      General: No focal deficit present.      Mental Status: She is alert, oriented to person, place, and time and easily aroused. Mental status is at baseline.   Psychiatric:         Attention and Perception: Attention normal.         Mood and Affect: Mood and affect normal.         Behavior: Behavior is cooperative.       ECOG SCORE            .Lab Review:  Lab Review:  CBC:   Recent Labs     03/27/23  0822   WBC 8.8   RBC 4.01*   HGB 11.9*   HCT 36.5*           Assessment/Plan:   Intermittent palpitations                    Referred to cardiology    Iron deficiency anemia - Primary  Iron deficiency: Refractory to prenatal and oral iron --stable  Now status post 1 dose of IV iron with a hemoglobin improved to 11.4.  She did have a reaction to the Injectafer and a 2nd reaction  On 2nd dose despite premedications    Abnormal PTT---patient did not have any bleeding with her recent D&C.         Plan  Continue prenatal vitamin   Discontinue oral iron   Iron rich diet   Agree with GI consultation (she has a follow-up in [place] already scheduled)  Continue follow-up with gyn to control her cycles         IV venofer or ferrelicit  if need IV iron again and ferritin less than 25- Iron deficiency: Refractory to prenatal and oral iron   Now status post 1 dose of IV iron with a hemoglobin improved to 11.4.  She did have a reaction to the Injectafer and a 2nd reaction  On 2nd dose despite premedications     Plan  Continue prenatal vitamin with menstrual cycles   Oral iron Dc'd  Iron rich diet   Agree with GI consultation (she has a follow-up in [place] already scheduled)  Continue follow-up with gyn to control her cycles    IV venofer or ferrelicit, if needs IV iron again and ferritin less than 50- Premed 650 tylenol  PTT elevated--check mixing and VWF  CBC, Iron, Ferritin on next visit        Follow up in about 6 months (around 9/27/2023) for with labs same day.        Henrik Martínez MD

## 2023-03-23 PROBLEM — R79.1 ABNORMAL PARTIAL THROMBOPLASTIN TIME (PTT): Status: ACTIVE | Noted: 2023-03-23

## 2023-03-27 ENCOUNTER — OFFICE VISIT (OUTPATIENT)
Dept: HEMATOLOGY/ONCOLOGY | Facility: CLINIC | Age: 23
End: 2023-03-27
Payer: COMMERCIAL

## 2023-03-27 VITALS
HEIGHT: 64 IN | TEMPERATURE: 98 F | BODY MASS INDEX: 30.56 KG/M2 | WEIGHT: 179 LBS | OXYGEN SATURATION: 98 % | DIASTOLIC BLOOD PRESSURE: 75 MMHG | SYSTOLIC BLOOD PRESSURE: 111 MMHG | HEART RATE: 70 BPM

## 2023-03-27 DIAGNOSIS — D50.8 OTHER IRON DEFICIENCY ANEMIA: Primary | ICD-10-CM

## 2023-03-27 PROCEDURE — 99213 OFFICE O/P EST LOW 20 MIN: CPT | Mod: S$GLB,,, | Performed by: INTERNAL MEDICINE

## 2023-03-27 PROCEDURE — 1159F MED LIST DOCD IN RCRD: CPT | Mod: CPTII,S$GLB,, | Performed by: INTERNAL MEDICINE

## 2023-03-27 PROCEDURE — 1160F PR REVIEW ALL MEDS BY PRESCRIBER/CLIN PHARMACIST DOCUMENTED: ICD-10-PCS | Mod: CPTII,S$GLB,, | Performed by: INTERNAL MEDICINE

## 2023-03-27 PROCEDURE — 99213 PR OFFICE/OUTPT VISIT, EST, LEVL III, 20-29 MIN: ICD-10-PCS | Mod: S$GLB,,, | Performed by: INTERNAL MEDICINE

## 2023-03-27 PROCEDURE — 3074F PR MOST RECENT SYSTOLIC BLOOD PRESSURE < 130 MM HG: ICD-10-PCS | Mod: CPTII,S$GLB,, | Performed by: INTERNAL MEDICINE

## 2023-03-27 PROCEDURE — 3008F PR BODY MASS INDEX (BMI) DOCUMENTED: ICD-10-PCS | Mod: CPTII,S$GLB,, | Performed by: INTERNAL MEDICINE

## 2023-03-27 PROCEDURE — 3008F BODY MASS INDEX DOCD: CPT | Mod: CPTII,S$GLB,, | Performed by: INTERNAL MEDICINE

## 2023-03-27 PROCEDURE — 99999 PR PBB SHADOW E&M-EST. PATIENT-LVL III: CPT | Mod: PBBFAC,,, | Performed by: INTERNAL MEDICINE

## 2023-03-27 PROCEDURE — 3078F PR MOST RECENT DIASTOLIC BLOOD PRESSURE < 80 MM HG: ICD-10-PCS | Mod: CPTII,S$GLB,, | Performed by: INTERNAL MEDICINE

## 2023-03-27 PROCEDURE — 99999 PR PBB SHADOW E&M-EST. PATIENT-LVL III: ICD-10-PCS | Mod: PBBFAC,,, | Performed by: INTERNAL MEDICINE

## 2023-03-27 PROCEDURE — 3078F DIAST BP <80 MM HG: CPT | Mod: CPTII,S$GLB,, | Performed by: INTERNAL MEDICINE

## 2023-03-27 PROCEDURE — 1159F PR MEDICATION LIST DOCUMENTED IN MEDICAL RECORD: ICD-10-PCS | Mod: CPTII,S$GLB,, | Performed by: INTERNAL MEDICINE

## 2023-03-27 PROCEDURE — 1160F RVW MEDS BY RX/DR IN RCRD: CPT | Mod: CPTII,S$GLB,, | Performed by: INTERNAL MEDICINE

## 2023-03-27 PROCEDURE — 3074F SYST BP LT 130 MM HG: CPT | Mod: CPTII,S$GLB,, | Performed by: INTERNAL MEDICINE

## 2023-03-29 ENCOUNTER — PATIENT MESSAGE (OUTPATIENT)
Dept: HEMATOLOGY/ONCOLOGY | Facility: CLINIC | Age: 23
End: 2023-03-29
Payer: MEDICAID

## 2023-03-29 DIAGNOSIS — R79.1 ABNORMAL PARTIAL THROMBOPLASTIN TIME (PTT): Primary | ICD-10-CM

## 2023-04-10 ENCOUNTER — LAB VISIT (OUTPATIENT)
Dept: LAB | Facility: HOSPITAL | Age: 23
End: 2023-04-10
Attending: LEGAL MEDICINE
Payer: COMMERCIAL

## 2023-04-10 DIAGNOSIS — R79.1 ABNORMAL PARTIAL THROMBOPLASTIN TIME (PTT): ICD-10-CM

## 2023-04-10 LAB — FACT VIII ACT/NOR PPP: 74 % (ref 59–191)

## 2023-04-10 PROCEDURE — 85240 CLOT FACTOR VIII AHG 1 STAGE: CPT

## 2023-04-10 PROCEDURE — 85610 PROTHROMBIN TIME: CPT

## 2023-04-10 PROCEDURE — 85240 CLOT FACTOR VIII AHG 1 STAGE: CPT | Mod: 91,90

## 2023-04-10 PROCEDURE — 85390 FIBRINOLYSINS SCREEN I&R: CPT

## 2023-04-10 PROCEDURE — 36415 COLL VENOUS BLD VENIPUNCTURE: CPT

## 2023-04-10 PROCEDURE — 85730 THROMBOPLASTIN TIME PARTIAL: CPT

## 2023-04-10 PROCEDURE — 85390 FIBRINOLYSINS SCREEN I&R: CPT | Mod: 90

## 2023-04-11 ENCOUNTER — PATIENT MESSAGE (OUTPATIENT)
Dept: HEMATOLOGY/ONCOLOGY | Facility: CLINIC | Age: 23
End: 2023-04-11
Payer: MEDICAID

## 2023-04-11 LAB
FACT VIII ACT/NOR PPP: 64 % (ref 55–200)
VON WILLEBRAND EVAL PPP-IMP: ABNORMAL
VWF AG ACT/NOR PPP IA: 49 % (ref 55–200)
VWF:AC ACT/NOR PPP IA: 46 % (ref 55–200)

## 2023-04-12 LAB
LA 3 SCREEN W REFLEX-IMP: NORMAL
PROVIDER SIGNING NAME: NORMAL

## 2023-04-26 ENCOUNTER — PATIENT MESSAGE (OUTPATIENT)
Dept: HEMATOLOGY/ONCOLOGY | Facility: CLINIC | Age: 23
End: 2023-04-26
Payer: MEDICAID

## 2023-04-27 DIAGNOSIS — R79.1 ABNORMAL PARTIAL THROMBOPLASTIN TIME (PTT): Primary | ICD-10-CM

## 2023-05-22 LAB — MAYO GENERIC ORDERABLE RESULT: NORMAL

## 2023-06-12 RX ORDER — NORGESTIMATE AND ETHINYL ESTRADIOL 7DAYSX3 28
KIT ORAL
COMMUNITY
Start: 2023-04-17 | End: 2023-06-14

## 2023-06-12 NOTE — PROGRESS NOTES
Subjective:        PATIENT: Ceci Abbott  MRN: [id number]  DATE: 2023  Chief Complaint: Follow-up (6 month f/u)    Referring group:  Nani Antony  Reason for referral anemia     Current Therapy: oral iron and prenatal vit                 S/p one dose of injectafer--reaction      This is a 22-year-old female who was seen by her primary care team  On laboratory review on 2022 patients have an iron of 27 saturation of 7, a ferritin of 4.8, her hemoglobin was 10, the platelet count of 390, white cell count of 10.  She is noted to have a normal BUN and creatinine.  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  She has dysfunctional uterine bleeding where she is been through multiple different options.  She continues to have PICA for ice, she has chronic heartburn and reflux.  In fact she had a scope when she was younger because of chronic reflux.    She is scheduled to see Gastroenterology.    She continues to breastfeed.  (the baby is 16 months)      Status post 1 dose of IV iron, attempted 2nd dose of IV iron continued to have chest pain and shortness of breath, hemoglobin improved after 1 dose,    2023  6m follow up for NIGEL. Her hgb is stable, she denies symptomatic anemia. She was evaluated by GI, no concerning findings.     Past Medical History:   Diagnosis Date    Depression     Hearing loss in left ear       .  Past Surgical History:   Procedure Laterality Date    ADENOIDECTOMY       SECTION      x2    eardrum reconstruction      SINUS SURGERY      TONSILLECTOMY        .  Family History   Problem Relation Age of Onset    Cancer Mother     Depression Mother     Hypertension Mother     Kidney disease Mother     Miscarriages / Stillbirths Mother      Arthritis Father     Hearing loss Maternal Grandfather     Heart disease Maternal Grandfather     Hypertension Maternal Grandfather     Arthritis Maternal Grandmother     Diabetes Maternal Grandmother     Drug abuse Maternal Grandmother       Social History     Socioeconomic History    Marital status:    Tobacco Use    Smoking status: Never    Smokeless tobacco: Never   Substance and Sexual Activity    Alcohol use: Never    Drug use: Never    Sexual activity: Yes     Partners: Male     Birth control/protection: OCP      .  Review of patient's allergies indicates:   Allergen Reactions    Injectafer [ferric carboxymaltose] Shortness Of Breath and Palpitations    Shellfish containing products         Current Outpatient Medications:     norethindrone (MICRONOR) 0.35 mg tablet, Take 1 tablet by mouth once daily., Disp: , Rfl:     omeprazole (PRILOSEC) 40 MG capsule, , Disp: , Rfl:     propranoloL (INDERAL) 10 MG tablet, Take 10 mg by mouth once daily., Disp: , Rfl:     sertraline (ZOLOFT) 100 MG tablet, Take 100 mg by mouth once daily., Disp: , Rfl:    Review of Systems   Constitutional:  Negative for appetite change and unexpected weight change.   HENT:  Negative for mouth sores.    Eyes:  Negative for visual disturbance.   Respiratory:  Negative for cough and shortness of breath.    Cardiovascular:  Positive for chest pain.   Gastrointestinal:  Negative for abdominal pain and diarrhea.   Genitourinary:  Negative for frequency.   Musculoskeletal:  Negative for back pain.   Skin:  Negative for rash.   Neurological:  Positive for headaches.   Hematological:  Negative for adenopathy.   Psychiatric/Behavioral:  The patient is not nervous/anxious.         Objective:     Vitals:    06/14/23 1258   BP: 102/69   Pulse: 62   Temp: 98.2 °F (36.8 °C)             Physical Exam  Vitals reviewed.   Constitutional:       General: She is awake.      Appearance: Normal appearance. She is not ill-appearing or diaphoretic.       Comments: Pale   HENT:      Head: Normocephalic and atraumatic.      Right Ear: Tympanic membrane normal.      Left Ear: Tympanic membrane normal.      Mouth/Throat:      Mouth: Mucous membranes are moist.      Pharynx: Oropharynx is clear.   Eyes:      Extraocular Movements: Extraocular movements intact.      Pupils: Pupils are equal, round, and reactive to light.      Comments: Palor   Cardiovascular:      Rate and Rhythm: Normal rate and regular rhythm.      Pulses: Normal pulses.      Heart sounds: Normal heart sounds.   Pulmonary:      Effort: Pulmonary effort is normal.      Breath sounds: Normal breath sounds and air entry.   Chest:   Breasts:     Right: Normal.      Left: Normal.   Abdominal:      General: Abdomen is flat. Bowel sounds are normal.      Palpations: Abdomen is soft.      Tenderness: There is no abdominal tenderness.   Musculoskeletal:      Cervical back: Normal range of motion.      Right lower leg: No edema.      Left lower leg: No edema.   Lymphadenopathy:      Cervical: No cervical adenopathy.      Upper Body:      Right upper body: No axillary adenopathy.      Left upper body: No axillary adenopathy.      Lower Body: No right inguinal adenopathy. No left inguinal adenopathy.   Skin:     General: Skin is warm and dry.   Neurological:      General: No focal deficit present.      Mental Status: She is alert, oriented to person, place, and time and easily aroused. Mental status is at baseline.   Psychiatric:         Attention and Perception: Attention normal.         Mood and Affect: Mood and affect normal.         Behavior: Behavior is cooperative.       ECOG SCORE              Assessment/Plan:   Iron deficiency anemia   Iron deficiency: Refractory to prenatal and oral iron --stable  Now status post 1 dose of IV iron with a hemoglobin improved to 11.4.  She did have a reaction to the Injectafer and a 2nd reaction  On 2nd dose despite premedications    Abnormal PTT---patient did not have any  bleeding with her recent D&C.        PLAN:  Continue prenatal vitamin with menstrual cycles   Oral iron Dc'd  Iron rich diet   Continue follow-up with gyn to control her cycles    IV venofer or ferrelicit, if needs IV iron again and ferritin less than 50- Premed 650 tylenol  PTT elevated--check mixing and VWF, labs pending          Follow up in about 6 months (around 12/14/2023) for Labs and OV with Dr. Martínez.

## 2023-06-14 ENCOUNTER — LAB VISIT (OUTPATIENT)
Dept: LAB | Facility: HOSPITAL | Age: 23
End: 2023-06-14
Attending: INTERNAL MEDICINE
Payer: COMMERCIAL

## 2023-06-14 ENCOUNTER — OFFICE VISIT (OUTPATIENT)
Dept: HEMATOLOGY/ONCOLOGY | Facility: CLINIC | Age: 23
End: 2023-06-14
Payer: COMMERCIAL

## 2023-06-14 VITALS
SYSTOLIC BLOOD PRESSURE: 102 MMHG | HEIGHT: 64 IN | HEART RATE: 62 BPM | OXYGEN SATURATION: 98 % | BODY MASS INDEX: 30.34 KG/M2 | DIASTOLIC BLOOD PRESSURE: 69 MMHG | TEMPERATURE: 98 F | WEIGHT: 177.69 LBS

## 2023-06-14 DIAGNOSIS — R79.1 ABNORMAL PARTIAL THROMBOPLASTIN TIME (PTT): ICD-10-CM

## 2023-06-14 DIAGNOSIS — D50.8 OTHER IRON DEFICIENCY ANEMIA: Primary | ICD-10-CM

## 2023-06-14 DIAGNOSIS — D50.8 OTHER IRON DEFICIENCY ANEMIA: ICD-10-CM

## 2023-06-14 LAB
BASOPHILS # BLD AUTO: 0.04 X10(3)/MCL
BASOPHILS NFR BLD AUTO: 0.5 %
EOSINOPHIL # BLD AUTO: 0.29 X10(3)/MCL (ref 0–0.9)
EOSINOPHIL NFR BLD AUTO: 3.6 %
ERYTHROCYTE [DISTWIDTH] IN BLOOD BY AUTOMATED COUNT: 12.5 % (ref 11.5–17)
FERRITIN SERPL-MCNC: 33.99 NG/ML (ref 4.63–204)
HCT VFR BLD AUTO: 36.4 % (ref 37–47)
HGB BLD-MCNC: 11.6 G/DL (ref 12–16)
IMM GRANULOCYTES # BLD AUTO: 0.01 X10(3)/MCL (ref 0–0.04)
IMM GRANULOCYTES NFR BLD AUTO: 0.1 %
IRON SATN MFR SERPL: 22 % (ref 20–50)
IRON SERPL-MCNC: 59 UG/DL (ref 50–170)
LYMPHOCYTES # BLD AUTO: 3.4 X10(3)/MCL (ref 0.6–4.6)
LYMPHOCYTES NFR BLD AUTO: 42.2 %
MCH RBC QN AUTO: 28.9 PG (ref 27–31)
MCHC RBC AUTO-ENTMCNC: 31.9 G/DL (ref 33–36)
MCV RBC AUTO: 90.5 FL (ref 80–94)
MONOCYTES # BLD AUTO: 0.46 X10(3)/MCL (ref 0.1–1.3)
MONOCYTES NFR BLD AUTO: 5.7 %
NEUTROPHILS # BLD AUTO: 3.86 X10(3)/MCL (ref 2.1–9.2)
NEUTROPHILS NFR BLD AUTO: 47.9 %
PLATELET # BLD AUTO: 327 X10(3)/MCL (ref 130–400)
PMV BLD AUTO: 8.6 FL (ref 7.4–10.4)
RBC # BLD AUTO: 4.02 X10(6)/MCL (ref 4.2–5.4)
TIBC SERPL-MCNC: 212 UG/DL (ref 70–310)
TIBC SERPL-MCNC: 271 UG/DL (ref 250–450)
TRANSFERRIN SERPL-MCNC: 237 MG/DL (ref 180–382)
WBC # SPEC AUTO: 8.06 X10(3)/MCL (ref 4.5–11.5)

## 2023-06-14 PROCEDURE — 99999 PR PBB SHADOW E&M-EST. PATIENT-LVL III: CPT | Mod: PBBFAC,,, | Performed by: NURSE PRACTITIONER

## 2023-06-14 PROCEDURE — 3008F PR BODY MASS INDEX (BMI) DOCUMENTED: ICD-10-PCS | Mod: CPTII,S$GLB,, | Performed by: NURSE PRACTITIONER

## 2023-06-14 PROCEDURE — 36415 COLL VENOUS BLD VENIPUNCTURE: CPT

## 2023-06-14 PROCEDURE — 99213 PR OFFICE/OUTPT VISIT, EST, LEVL III, 20-29 MIN: ICD-10-PCS | Mod: S$GLB,,, | Performed by: NURSE PRACTITIONER

## 2023-06-14 PROCEDURE — 83550 IRON BINDING TEST: CPT

## 2023-06-14 PROCEDURE — 85025 COMPLETE CBC W/AUTO DIFF WBC: CPT

## 2023-06-14 PROCEDURE — 1159F MED LIST DOCD IN RCRD: CPT | Mod: CPTII,S$GLB,, | Performed by: NURSE PRACTITIONER

## 2023-06-14 PROCEDURE — 99999 PR PBB SHADOW E&M-EST. PATIENT-LVL III: ICD-10-PCS | Mod: PBBFAC,,, | Performed by: NURSE PRACTITIONER

## 2023-06-14 PROCEDURE — 1159F PR MEDICATION LIST DOCUMENTED IN MEDICAL RECORD: ICD-10-PCS | Mod: CPTII,S$GLB,, | Performed by: NURSE PRACTITIONER

## 2023-06-14 PROCEDURE — 99213 OFFICE O/P EST LOW 20 MIN: CPT | Mod: S$GLB,,, | Performed by: NURSE PRACTITIONER

## 2023-06-14 PROCEDURE — 3074F SYST BP LT 130 MM HG: CPT | Mod: CPTII,S$GLB,, | Performed by: NURSE PRACTITIONER

## 2023-06-14 PROCEDURE — 82728 ASSAY OF FERRITIN: CPT

## 2023-06-14 PROCEDURE — 85247 CLOT FACTOR VIII MULTIMETRIC: CPT

## 2023-06-14 PROCEDURE — 3008F BODY MASS INDEX DOCD: CPT | Mod: CPTII,S$GLB,, | Performed by: NURSE PRACTITIONER

## 2023-06-14 PROCEDURE — 1160F RVW MEDS BY RX/DR IN RCRD: CPT | Mod: CPTII,S$GLB,, | Performed by: NURSE PRACTITIONER

## 2023-06-14 PROCEDURE — 1160F PR REVIEW ALL MEDS BY PRESCRIBER/CLIN PHARMACIST DOCUMENTED: ICD-10-PCS | Mod: CPTII,S$GLB,, | Performed by: NURSE PRACTITIONER

## 2023-06-14 PROCEDURE — 3074F PR MOST RECENT SYSTOLIC BLOOD PRESSURE < 130 MM HG: ICD-10-PCS | Mod: CPTII,S$GLB,, | Performed by: NURSE PRACTITIONER

## 2023-06-14 PROCEDURE — 3078F PR MOST RECENT DIASTOLIC BLOOD PRESSURE < 80 MM HG: ICD-10-PCS | Mod: CPTII,S$GLB,, | Performed by: NURSE PRACTITIONER

## 2023-06-14 PROCEDURE — 3078F DIAST BP <80 MM HG: CPT | Mod: CPTII,S$GLB,, | Performed by: NURSE PRACTITIONER

## 2023-06-26 LAB — MAYO GENERIC ORDERABLE RESULT: NORMAL

## 2024-01-09 ENCOUNTER — PATIENT MESSAGE (OUTPATIENT)
Dept: HEMATOLOGY/ONCOLOGY | Facility: CLINIC | Age: 24
End: 2024-01-09
Payer: COMMERCIAL

## 2024-01-22 ENCOUNTER — TELEPHONE (OUTPATIENT)
Dept: HEMATOLOGY/ONCOLOGY | Facility: CLINIC | Age: 24
End: 2024-01-22
Payer: COMMERCIAL

## 2024-01-31 NOTE — PROGRESS NOTES
"Subjective:        PATIENT: Ceci Abbott  MRN: [id number]  DATE: 2024  Chief Complaint: Follow-up (Patient is here for her 6 month visit)    Referring group:  Nani Antony  Reason for referral anemia     Current Therapy: oral iron and prenatal vit                 S/p one dose of injectafer--reaction    This is a 22-year-old female who was seen by her primary care team  On laboratory review on 2022 patients have an iron of 27 saturation of 7, a ferritin of 4.8, her hemoglobin was 10, the platelet count of 390, white cell count of 10.  She is noted to have a normal BUN and creatinine.  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  She has dysfunctional uterine bleeding where she is been through multiple different options.  She continues to have PICA for ice, she has chronic heartburn and reflux.  In fact she had a scope when she was younger because of chronic reflux.    She is scheduled to see Gastroenterology.    She continues to breastfeed.  (the baby is 16 months)  Status post 1 dose of IV iron, attempted 2nd dose of IV iron continued to have chest pain and shortness of breath, hemoglobin improved after 1 dose,    2024    6m follow up for NIGEL.     Patient tried multi birth control but still bleeding; some blood in urine.  She has appointment 24 with Urologist (Dr. To) to explore for Endometriosis.  Denies abnormal bruising and SOB.  Denies ice craving.  Writing Stimate nasal spray to assist with bleeding; do not use if "normal" cycle.  Ok for surgery/biopsy via Urology.  Advised to stay away from Aspirin and Ibuprofen.  After Urology appointment will test for Von Willebrand.    Reviewed labs with patient - not anemic.  Continue taking oral iron every " other day.    Past Medical History:   Diagnosis Date    Depression     Hearing loss in left ear       Past Surgical History:   Procedure Laterality Date    ADENOIDECTOMY       SECTION      x2    eardrum reconstruction      SINUS SURGERY      TONSILLECTOMY        Family History   Problem Relation Age of Onset    Cancer Mother     Depression Mother     Hypertension Mother     Kidney disease Mother     Miscarriages / Stillbirths Mother     Arthritis Father     Hearing loss Maternal Grandfather     Heart disease Maternal Grandfather     Hypertension Maternal Grandfather     Arthritis Maternal Grandmother     Diabetes Maternal Grandmother     Drug abuse Maternal Grandmother       Social History     Socioeconomic History    Marital status:    Tobacco Use    Smoking status: Never    Smokeless tobacco: Never   Substance and Sexual Activity    Alcohol use: Never    Drug use: Never    Sexual activity: Yes     Partners: Male     Birth control/protection: OCP      Review of patient's allergies indicates:   Allergen Reactions    Injectafer [ferric carboxymaltose] Shortness Of Breath and Palpitations    Shellfish containing products      Current Outpatient Medications:     dextroamphetamine-amphetamine (ADDERALL XR) 10 MG 24 hr capsule, Take by mouth daily as needed., Disp: , Rfl:     norethindrone (MICRONOR) 0.35 mg tablet, Take 1 tablet by mouth once daily., Disp: , Rfl:     omeprazole (PRILOSEC) 40 MG capsule, , Disp: , Rfl:     propranoloL (INDERAL) 10 MG tablet, Take 10 mg by mouth once daily., Disp: , Rfl:     sertraline (ZOLOFT) 100 MG tablet, Take 100 mg by mouth once daily., Disp: , Rfl:     desmopressin (STIMATE) 150 mcg/spray (0.1 mL) nasal spray, 1 spray (150 mcg total) by Left Nostril route As instructed (once if stignificant bleeding)., Disp: 150 mL, Rfl: 0   Review of Systems   Constitutional:  Negative for appetite change and unexpected weight change.   HENT:  Negative for mouth sores.    Eyes:   Negative for visual disturbance.   Respiratory:  Negative for cough and shortness of breath.    Cardiovascular:  Positive for chest pain.   Gastrointestinal:  Negative for abdominal pain and diarrhea.   Genitourinary:  Negative for frequency.   Musculoskeletal:  Negative for back pain.   Skin:  Negative for rash.   Neurological:  Positive for headaches.   Hematological:  Negative for adenopathy.   Psychiatric/Behavioral:  The patient is not nervous/anxious.         Objective:     Vitals:    02/01/24 0938   BP: 114/83   Pulse: 83   Temp: 97.8 °F (36.6 °C)         Physical Exam  Vitals reviewed.   Constitutional:       General: She is awake.      Appearance: Normal appearance. She is not ill-appearing or diaphoretic.      Comments: Pale   HENT:      Head: Normocephalic and atraumatic.      Right Ear: Tympanic membrane normal.      Left Ear: Tympanic membrane normal.      Mouth/Throat:      Mouth: Mucous membranes are moist.      Pharynx: Oropharynx is clear.   Eyes:      Extraocular Movements: Extraocular movements intact.      Pupils: Pupils are equal, round, and reactive to light.      Comments: Palor   Cardiovascular:      Rate and Rhythm: Normal rate and regular rhythm.      Pulses: Normal pulses.      Heart sounds: Normal heart sounds.   Pulmonary:      Effort: Pulmonary effort is normal.      Breath sounds: Normal breath sounds and air entry.   Chest:   Breasts:     Right: Normal.      Left: Normal.   Abdominal:      General: Abdomen is flat. Bowel sounds are normal.      Palpations: Abdomen is soft.      Tenderness: There is no abdominal tenderness.   Musculoskeletal:      Cervical back: Normal range of motion.      Right lower leg: No edema.      Left lower leg: No edema.   Lymphadenopathy:      Cervical: No cervical adenopathy.      Upper Body:      Right upper body: No axillary adenopathy.      Left upper body: No axillary adenopathy.      Lower Body: No right inguinal adenopathy. No left inguinal  adenopathy.   Skin:     General: Skin is warm and dry.   Neurological:      General: No focal deficit present.      Mental Status: She is alert, oriented to person, place, and time and easily aroused. Mental status is at baseline.   Psychiatric:         Attention and Perception: Attention normal.         Mood and Affect: Mood and affect normal.         Behavior: Behavior is cooperative.         ECOG SCORE              Assessment/Plan:   Iron deficiency anemia   Iron deficiency: Refractory to prenatal and oral iron --stable  Now status post 1 dose of IV iron with a hemoglobin improved to 11.4.  She did have a reaction to the Injectafer and a 2nd reaction  On 2nd dose despite premedications    2.  Dysfunctional uterine bleeding  Low von Willebrand's   Normal platelet aggregation studies, waxing and waning von Willebrand's factors normal multimers.  Elevated PTT, no evidence of lupus anticoagulant,      With the hematuria will not try TXA   While she has had no significant bleeding with her C-sections or her other surgeries, she has had urinary bleeding.  She is scheduled for a cystoscopy and biopsy.    Instead of tranexamic acid I would recommend DDAVP 0.3 mcg, max at 20 mcg  30 minutes prior to surgery.        PLAN:  Continue prenatal vitamin with menstrual cycles   Oral iron Dc'd  Iron rich diet   Continue follow-up with gyn to control her cycles    IV venofer or ferrelicit, if needs IV iron again and ferritin less than 50- Premed 650 tylenol  DDAVP prior to surgery --0.3 micrograms/kilogram max 20  mcg  Prescription for Stimate if worse  DDAVP repeat testing, where she has a von Willebrand's testing done    Plan   DDAvP challenge   0.2 micrograms/kilogram is given and 50 mL over 20 30 minutes, blood samples for von Willebrand's factor antigen and panel and factor 8 activity are obtained before DDAVP and at     This profile is designed to assess the response of DDAVP treatment in patients presenting with Von  Willebrand Disease by measuring VWF and factor VIII levels pre-treatment (baseline) and at two time-points (30m  and 60min) post-treatment. Post-treatment VWF levels are evaluated against baseline results to determine whether the response is adequate and sustained. Results should be correlated with clinical symptoms and family history.          Follow up in about 5 weeks (around 3/7/2024) for Labs (CBC/VonWB), OV with Mauricio and infusion visit.      I, Keri Lala LPN, acted solely as a scribe for and in the presence of, Dr. Henrik Martínez who performed the service.       I spoke to her urologist regarding to DDAVP prior to the procedure

## 2024-02-01 ENCOUNTER — LAB VISIT (OUTPATIENT)
Dept: LAB | Facility: HOSPITAL | Age: 24
End: 2024-02-01
Attending: INTERNAL MEDICINE
Payer: COMMERCIAL

## 2024-02-01 ENCOUNTER — OFFICE VISIT (OUTPATIENT)
Dept: HEMATOLOGY/ONCOLOGY | Facility: CLINIC | Age: 24
End: 2024-02-01
Payer: COMMERCIAL

## 2024-02-01 VITALS
DIASTOLIC BLOOD PRESSURE: 83 MMHG | HEIGHT: 64 IN | OXYGEN SATURATION: 100 % | SYSTOLIC BLOOD PRESSURE: 114 MMHG | WEIGHT: 176.81 LBS | HEART RATE: 83 BPM | BODY MASS INDEX: 30.19 KG/M2 | TEMPERATURE: 98 F

## 2024-02-01 DIAGNOSIS — R79.1 ABNORMAL PARTIAL THROMBOPLASTIN TIME (PTT): ICD-10-CM

## 2024-02-01 DIAGNOSIS — D50.8 OTHER IRON DEFICIENCY ANEMIA: ICD-10-CM

## 2024-02-01 DIAGNOSIS — D50.8 OTHER IRON DEFICIENCY ANEMIA: Primary | ICD-10-CM

## 2024-02-01 LAB
BASOPHILS # BLD AUTO: 0.05 X10(3)/MCL
BASOPHILS NFR BLD AUTO: 0.5 %
EOSINOPHIL # BLD AUTO: 0.28 X10(3)/MCL (ref 0–0.9)
EOSINOPHIL NFR BLD AUTO: 2.6 %
ERYTHROCYTE [DISTWIDTH] IN BLOOD BY AUTOMATED COUNT: 13.1 % (ref 11.5–17)
FERRITIN SERPL-MCNC: 18.94 NG/ML (ref 4.63–204)
FOLATE SERPL-MCNC: 7.6 NG/ML (ref 7–31.4)
HCT VFR BLD AUTO: 37.5 % (ref 37–47)
HGB BLD-MCNC: 12.1 G/DL (ref 12–16)
IMM GRANULOCYTES # BLD AUTO: 0.01 X10(3)/MCL (ref 0–0.04)
IMM GRANULOCYTES NFR BLD AUTO: 0.1 %
IRON SATN MFR SERPL: 12 % (ref 20–50)
IRON SERPL-MCNC: 34 UG/DL (ref 50–170)
LYMPHOCYTES # BLD AUTO: 4.17 X10(3)/MCL (ref 0.6–4.6)
LYMPHOCYTES NFR BLD AUTO: 38.9 %
MCH RBC QN AUTO: 27.6 PG (ref 27–31)
MCHC RBC AUTO-ENTMCNC: 32.3 G/DL (ref 33–36)
MCV RBC AUTO: 85.4 FL (ref 80–94)
MONOCYTES # BLD AUTO: 0.53 X10(3)/MCL (ref 0.1–1.3)
MONOCYTES NFR BLD AUTO: 4.9 %
NEUTROPHILS # BLD AUTO: 5.68 X10(3)/MCL (ref 2.1–9.2)
NEUTROPHILS NFR BLD AUTO: 53 %
PLATELET # BLD AUTO: 428 X10(3)/MCL (ref 130–400)
PMV BLD AUTO: 8.4 FL (ref 7.4–10.4)
RBC # BLD AUTO: 4.39 X10(6)/MCL (ref 4.2–5.4)
TIBC SERPL-MCNC: 250 UG/DL (ref 70–310)
TIBC SERPL-MCNC: 284 UG/DL (ref 250–450)
TRANSFERRIN SERPL-MCNC: 278 MG/DL (ref 180–382)
VIT B12 SERPL-MCNC: 291 PG/ML (ref 213–816)
WBC # SPEC AUTO: 10.72 X10(3)/MCL (ref 4.5–11.5)

## 2024-02-01 PROCEDURE — 85025 COMPLETE CBC W/AUTO DIFF WBC: CPT

## 2024-02-01 PROCEDURE — 3008F BODY MASS INDEX DOCD: CPT | Mod: CPTII,S$GLB,, | Performed by: INTERNAL MEDICINE

## 2024-02-01 PROCEDURE — 3074F SYST BP LT 130 MM HG: CPT | Mod: CPTII,S$GLB,, | Performed by: INTERNAL MEDICINE

## 2024-02-01 PROCEDURE — 3079F DIAST BP 80-89 MM HG: CPT | Mod: CPTII,S$GLB,, | Performed by: INTERNAL MEDICINE

## 2024-02-01 PROCEDURE — 99999 PR PBB SHADOW E&M-EST. PATIENT-LVL III: CPT | Mod: PBBFAC,,, | Performed by: INTERNAL MEDICINE

## 2024-02-01 PROCEDURE — 99214 OFFICE O/P EST MOD 30 MIN: CPT | Mod: S$GLB,,, | Performed by: INTERNAL MEDICINE

## 2024-02-01 PROCEDURE — 82728 ASSAY OF FERRITIN: CPT

## 2024-02-01 PROCEDURE — 82746 ASSAY OF FOLIC ACID SERUM: CPT

## 2024-02-01 PROCEDURE — 83540 ASSAY OF IRON: CPT

## 2024-02-01 PROCEDURE — 1159F MED LIST DOCD IN RCRD: CPT | Mod: CPTII,S$GLB,, | Performed by: INTERNAL MEDICINE

## 2024-02-01 PROCEDURE — 82607 VITAMIN B-12: CPT

## 2024-02-01 PROCEDURE — 1160F RVW MEDS BY RX/DR IN RCRD: CPT | Mod: CPTII,S$GLB,, | Performed by: INTERNAL MEDICINE

## 2024-02-01 PROCEDURE — 36415 COLL VENOUS BLD VENIPUNCTURE: CPT

## 2024-02-01 RX ORDER — DEXTROAMPHETAMINE SACCHARATE, AMPHETAMINE ASPARTATE MONOHYDRATE, DEXTROAMPHETAMINE SULFATE AND AMPHETAMINE SULFATE 5; 5; 5; 5 MG/1; MG/1; MG/1; MG/1
CAPSULE, EXTENDED RELEASE ORAL DAILY PRN
COMMUNITY
Start: 2024-01-05

## 2024-02-01 RX ORDER — DESMOPRESSIN ACETATE 150/SPRAY
150 AEROSOL, SPRAY WITH PUMP (EA) NASAL SEE ADMIN INSTRUCTIONS
Qty: 150 ML | Refills: 0 | Status: SHIPPED | OUTPATIENT
Start: 2024-02-01 | End: 2025-01-31

## 2024-02-02 ENCOUNTER — TELEPHONE (OUTPATIENT)
Dept: HEMATOLOGY/ONCOLOGY | Facility: CLINIC | Age: 24
End: 2024-02-02
Payer: COMMERCIAL

## 2024-02-02 DIAGNOSIS — D50.9 IRON DEFICIENCY ANEMIA, UNSPECIFIED IRON DEFICIENCY ANEMIA TYPE: Primary | ICD-10-CM

## 2024-02-02 DIAGNOSIS — R79.1 ABNORMAL PARTIAL THROMBOPLASTIN TIME (PTT): ICD-10-CM

## 2024-02-02 NOTE — TELEPHONE ENCOUNTER
Spoke to patient and advised of below.  She acknowledged understanding and thanked me for calling.    ----- Message from Henrik Martínez MD sent at 2/1/2024  4:32 PM CST -----  We would like her B12 higher.    Please make sure she has taking a 1000 mcg of B12 daily at night.    And please recheck her iron level and B12 level on follow-up

## 2024-03-06 NOTE — PROGRESS NOTES
Subjective:        PATIENT: Ceci Abbott  MRN: [id number]  DATE: 3/7/2024  Chief Complaint: Follow-up (Patient is here for her 5 week visit, questions about her infusion today)    Referring group:  Nani Antony  Reason for referral anemia     Current Therapy: oral iron and prenatal vit                 S/p one dose of injectafer--reaction    This is a 22-year-old female who was seen by her primary care team  On laboratory review on 2022 patients have an iron of 27 saturation of 7, a ferritin of 4.8, her hemoglobin was 10, the platelet count of 390, white cell count of 10.  She is noted to have a normal BUN and creatinine.  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  She has dysfunctional uterine bleeding where she is been through multiple different options.  She continues to have PICA for ice, she has chronic heartburn and reflux.  In fact she had a scope when she was younger because of chronic reflux.    She is scheduled to see Gastroenterology.    She continues to breastfeed.  (the baby is 16 months)  Status post 1 dose of IV iron, attempted 2nd dose of IV iron continued to have chest pain and shortness of breath, hemoglobin improved after 1 dose,    2024  DDAVP challenge today. Was seen 4 weeks ago. Since then she underwent cystoscope without any issues. She did not get the stimate due to cost.     Past Medical History:   Diagnosis Date    Depression     Hearing loss in left ear       Past Surgical History:   Procedure Laterality Date    ADENOIDECTOMY       SECTION      x2    eardrum reconstruction      SINUS SURGERY      TONSILLECTOMY        Family History   Problem Relation Age of Onset    Cancer Mother     Depression Mother     Hypertension Mother      Kidney disease Mother     Miscarriages / Stillbirths Mother     Arthritis Father     Hearing loss Maternal Grandfather     Heart disease Maternal Grandfather     Hypertension Maternal Grandfather     Arthritis Maternal Grandmother     Diabetes Maternal Grandmother     Drug abuse Maternal Grandmother       Social History     Socioeconomic History    Marital status:    Tobacco Use    Smoking status: Never    Smokeless tobacco: Never   Substance and Sexual Activity    Alcohol use: Never    Drug use: Never    Sexual activity: Yes     Partners: Male     Birth control/protection: OCP      Review of patient's allergies indicates:   Allergen Reactions    Injectafer [ferric carboxymaltose] Shortness Of Breath and Palpitations    Shellfish containing products      Current Outpatient Medications:     desmopressin (STIMATE) 150 mcg/spray (0.1 mL) nasal spray, 1 spray (150 mcg total) by Left Nostril route As instructed (once if stignificant bleeding)., Disp: 150 mL, Rfl: 0    dextroamphetamine-amphetamine (ADDERALL XR) 20 MG 24 hr capsule, Take by mouth daily as needed., Disp: , Rfl:     norethindrone (MICRONOR) 0.35 mg tablet, Take 1 tablet by mouth once daily., Disp: , Rfl:     omeprazole (PRILOSEC) 40 MG capsule, , Disp: , Rfl:     propranoloL (INDERAL) 10 MG tablet, Take 10 mg by mouth once daily., Disp: , Rfl:     sertraline (ZOLOFT) 100 MG tablet, Take 100 mg by mouth once daily., Disp: , Rfl:    Review of Systems   Constitutional:  Negative for appetite change and unexpected weight change.   HENT:  Negative for mouth sores.    Eyes:  Negative for visual disturbance.   Respiratory:  Negative for cough and shortness of breath.    Cardiovascular:  Positive for chest pain.   Gastrointestinal:  Negative for abdominal pain and diarrhea.   Genitourinary:  Negative for frequency.   Musculoskeletal:  Negative for back pain.   Skin:  Negative for rash.   Neurological:  Positive for headaches.   Hematological:  Negative for  adenopathy.   Psychiatric/Behavioral:  The patient is not nervous/anxious.         Objective:     Vitals:    03/07/24 0958   BP: 110/76   Pulse: 72   Temp: 97.9 °F (36.6 °C)         Physical Exam  Vitals reviewed.   Constitutional:       General: She is awake.      Appearance: Normal appearance. She is not ill-appearing or diaphoretic.      Comments: Pale   HENT:      Head: Normocephalic and atraumatic.      Right Ear: Tympanic membrane normal.      Left Ear: Tympanic membrane normal.      Mouth/Throat:      Mouth: Mucous membranes are moist.      Pharynx: Oropharynx is clear.   Eyes:      Extraocular Movements: Extraocular movements intact.      Pupils: Pupils are equal, round, and reactive to light.      Comments: Palor   Cardiovascular:      Rate and Rhythm: Normal rate and regular rhythm.      Pulses: Normal pulses.      Heart sounds: Normal heart sounds.   Pulmonary:      Effort: Pulmonary effort is normal.      Breath sounds: Normal breath sounds and air entry.   Chest:   Breasts:     Right: Normal.      Left: Normal.   Abdominal:      General: Abdomen is flat. Bowel sounds are normal.      Palpations: Abdomen is soft.      Tenderness: There is no abdominal tenderness.   Musculoskeletal:      Cervical back: Normal range of motion.      Right lower leg: No edema.      Left lower leg: No edema.   Lymphadenopathy:      Cervical: No cervical adenopathy.      Upper Body:      Right upper body: No axillary adenopathy.      Left upper body: No axillary adenopathy.      Lower Body: No right inguinal adenopathy. No left inguinal adenopathy.   Skin:     General: Skin is warm and dry.   Neurological:      General: No focal deficit present.      Mental Status: She is alert, oriented to person, place, and time and easily aroused. Mental status is at baseline.   Psychiatric:         Attention and Perception: Attention normal.         Mood and Affect: Mood and affect normal.         Behavior: Behavior is cooperative.          ECOG SCORE              Assessment/Plan:   Iron deficiency anemia   Iron deficiency: Refractory to prenatal and oral iron --stable  Now status post 1 dose of IV iron with a hemoglobin improved to 11.4.  She did have a reaction to the Injectafer and a 2nd reaction  On 2nd dose despite premedications    2.Dysfunctional uterine bleeding  Low von Willebrand's   Normal platelet aggregation studies, waxing and waning von Willebrand's factors normal multimers.  Elevated PTT, no evidence of lupus anticoagulant      With the hematuria will not try TXA   While she has had no significant bleeding with her C-sections or her other surgeries, she has had urinary bleeding.  She is scheduled for a cystoscopy and biopsy.    Instead of tranexamic acid I would recommend DDAVP 0.3 mcg, max at 20 mcg  30 minutes prior to surgery.        PLAN:  Continue prenatal vitamin with menstrual cycles   Oral iron Dc'd  Iron rich diet   Continue follow-up with gyn to control her cycles    IV venofer or ferrelicit, if needs IV iron again and ferritin less than 50- Premed 650 tylenol  DDAVP prior to surgery --0.3 micrograms/kilogram max 20  mcg  Prescription for Stimate if worse  DDAVP repeat testing, where she has a von Willebrand's testing done      DDAvP challenge today  0.2 micrograms/kilogram is given and 50 mL over 20 30 minutes, blood samples for von Willebrand's factor antigen and panel and factor 8 activity are obtained before DDAVP and at     This profile is designed to assess the response of DDAVP treatment in patients presenting with Von Willebrand Disease by measuring VWF and factor VIII levels pre-treatment (baseline) and at two time-points (30m  and 60min) post-treatment. Post-treatment VWF levels are evaluated against baseline results to determine whether the response is adequate and sustained. Results should be correlated with clinical symptoms and family history.          Follow up in about 3 weeks (around 3/28/2024) for VV with  Dr. Martínez for DDAVP test results.

## 2024-03-07 ENCOUNTER — INFUSION (OUTPATIENT)
Dept: INFUSION THERAPY | Facility: HOSPITAL | Age: 24
End: 2024-03-07
Attending: INTERNAL MEDICINE
Payer: COMMERCIAL

## 2024-03-07 ENCOUNTER — OFFICE VISIT (OUTPATIENT)
Dept: HEMATOLOGY/ONCOLOGY | Facility: CLINIC | Age: 24
End: 2024-03-07
Payer: COMMERCIAL

## 2024-03-07 VITALS
WEIGHT: 179.88 LBS | HEIGHT: 64 IN | SYSTOLIC BLOOD PRESSURE: 110 MMHG | DIASTOLIC BLOOD PRESSURE: 76 MMHG | TEMPERATURE: 98 F | OXYGEN SATURATION: 98 % | BODY MASS INDEX: 30.71 KG/M2 | HEART RATE: 72 BPM

## 2024-03-07 VITALS
SYSTOLIC BLOOD PRESSURE: 108 MMHG | DIASTOLIC BLOOD PRESSURE: 72 MMHG | HEART RATE: 94 BPM | TEMPERATURE: 98 F | OXYGEN SATURATION: 100 %

## 2024-03-07 DIAGNOSIS — R79.1 ABNORMAL PARTIAL THROMBOPLASTIN TIME (PTT): Primary | ICD-10-CM

## 2024-03-07 DIAGNOSIS — R79.1 ELEVATED PARTIAL THROMBOPLASTIN TIME (PTT): ICD-10-CM

## 2024-03-07 DIAGNOSIS — D50.9 IRON DEFICIENCY ANEMIA, UNSPECIFIED IRON DEFICIENCY ANEMIA TYPE: ICD-10-CM

## 2024-03-07 LAB — FACT VIII ACT/NOR PPP: >300 % (ref 59–191)

## 2024-03-07 PROCEDURE — 1159F MED LIST DOCD IN RCRD: CPT | Mod: CPTII,S$GLB,, | Performed by: NURSE PRACTITIONER

## 2024-03-07 PROCEDURE — 25000003 PHARM REV CODE 250: Performed by: INTERNAL MEDICINE

## 2024-03-07 PROCEDURE — 85240 CLOT FACTOR VIII AHG 1 STAGE: CPT | Mod: 91

## 2024-03-07 PROCEDURE — 36415 COLL VENOUS BLD VENIPUNCTURE: CPT

## 2024-03-07 PROCEDURE — 96365 THER/PROPH/DIAG IV INF INIT: CPT

## 2024-03-07 PROCEDURE — 3078F DIAST BP <80 MM HG: CPT | Mod: CPTII,S$GLB,, | Performed by: NURSE PRACTITIONER

## 2024-03-07 PROCEDURE — 85246 CLOT FACTOR VIII VW ANTIGEN: CPT

## 2024-03-07 PROCEDURE — 85240 CLOT FACTOR VIII AHG 1 STAGE: CPT

## 2024-03-07 PROCEDURE — 99214 OFFICE O/P EST MOD 30 MIN: CPT | Mod: S$GLB,,, | Performed by: NURSE PRACTITIONER

## 2024-03-07 PROCEDURE — 3008F BODY MASS INDEX DOCD: CPT | Mod: CPTII,S$GLB,, | Performed by: NURSE PRACTITIONER

## 2024-03-07 PROCEDURE — 1160F RVW MEDS BY RX/DR IN RCRD: CPT | Mod: CPTII,S$GLB,, | Performed by: NURSE PRACTITIONER

## 2024-03-07 PROCEDURE — 3074F SYST BP LT 130 MM HG: CPT | Mod: CPTII,S$GLB,, | Performed by: NURSE PRACTITIONER

## 2024-03-07 PROCEDURE — 63600175 PHARM REV CODE 636 W HCPCS: Mod: JZ,JG | Performed by: INTERNAL MEDICINE

## 2024-03-07 PROCEDURE — 99999 PR PBB SHADOW E&M-EST. PATIENT-LVL III: CPT | Mod: PBBFAC,,, | Performed by: NURSE PRACTITIONER

## 2024-03-07 RX ORDER — HEPARIN 100 UNIT/ML
500 SYRINGE INTRAVENOUS
Status: CANCELLED | OUTPATIENT
Start: 2024-03-07

## 2024-03-07 RX ORDER — HEPARIN 100 UNIT/ML
500 SYRINGE INTRAVENOUS
Status: DISCONTINUED | OUTPATIENT
Start: 2024-03-07 | End: 2024-03-07 | Stop reason: HOSPADM

## 2024-03-07 RX ORDER — SODIUM CHLORIDE 0.9 % (FLUSH) 0.9 %
10 SYRINGE (ML) INJECTION
Status: DISCONTINUED | OUTPATIENT
Start: 2024-03-07 | End: 2024-03-07 | Stop reason: HOSPADM

## 2024-03-07 RX ORDER — SODIUM CHLORIDE 0.9 % (FLUSH) 0.9 %
10 SYRINGE (ML) INJECTION
Status: CANCELLED | OUTPATIENT
Start: 2024-03-07

## 2024-03-07 RX ADMIN — DESMOPRESSIN ACETATE 16 MCG: 4 SOLUTION INTRAVENOUS at 11:03

## 2024-03-08 LAB
FACT VIII ACT/NOR PPP: 305 % (ref 55–200)
VON WILLEBRAND EVAL PPP-IMP: ABNORMAL
VWF AG ACT/NOR PPP IA: 180 % (ref 55–200)
VWF:AC ACT/NOR PPP IA: 149 % (ref 55–200)

## 2024-04-01 NOTE — PROGRESS NOTES
Subjective:        PATIENT: Ceci Abbott  MRN: [id number]  DATE: 2024  Chief Complaint: Follow-up (3 week virtual visit)    Referring group:  Nani Antony  Reason for referral anemia     Current Therapy: oral iron and prenatal vit                 S/p one dose of injectafer--reaction    This is a 22-year-old female who was seen by her primary care team  On laboratory review on 2022 patients have an iron of 27 saturation of 7, a ferritin of 4.8, her hemoglobin was 10, the platelet count of 390, white cell count of 10.  She is noted to have a normal BUN and creatinine.  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  She has dysfunctional uterine bleeding where she is been through multiple different options.  She continues to have PICA for ice, she has chronic heartburn and reflux.  In fact she had a scope when she was younger because of chronic reflux.    She is scheduled to see Gastroenterology.    She continues to breastfeed.  (the baby is 16 months)  Status post 1 dose of IV iron, attempted 2nd dose of IV iron continued to have chest pain and shortness of breath, hemoglobin improved after 1 dose,    2024  Patient presents for follow up visit for PTT.    Past Medical History:   Diagnosis Date    Depression     Hearing loss in left ear       Past Surgical History:   Procedure Laterality Date    ADENOIDECTOMY       SECTION      x2    eardrum reconstruction      SINUS SURGERY      TONSILLECTOMY        Family History   Problem Relation Age of Onset    Cancer Mother     Depression Mother     Hypertension Mother     Kidney disease Mother     Miscarriages / Stillbirths Mother     Arthritis Father     Hearing loss Maternal Grandfather     Heart  disease Maternal Grandfather     Hypertension Maternal Grandfather     Arthritis Maternal Grandmother     Diabetes Maternal Grandmother     Drug abuse Maternal Grandmother       Social History     Socioeconomic History    Marital status:    Tobacco Use    Smoking status: Never    Smokeless tobacco: Never   Substance and Sexual Activity    Alcohol use: Never    Drug use: Never    Sexual activity: Yes     Partners: Male     Birth control/protection: OCP      Review of patient's allergies indicates:   Allergen Reactions    Injectafer [ferric carboxymaltose] Shortness Of Breath and Palpitations    Shellfish containing products      Current Outpatient Medications:     desmopressin (STIMATE) 150 mcg/spray (0.1 mL) nasal spray, 1 spray (150 mcg total) by Left Nostril route As instructed (once if stignificant bleeding)., Disp: 150 mL, Rfl: 0    dextroamphetamine-amphetamine (ADDERALL XR) 20 MG 24 hr capsule, Take by mouth daily as needed., Disp: , Rfl:     norethindrone (MICRONOR) 0.35 mg tablet, Take 1 tablet by mouth once daily., Disp: , Rfl:     omeprazole (PRILOSEC) 40 MG capsule, , Disp: , Rfl:     propranoloL (INDERAL) 10 MG tablet, Take 10 mg by mouth once daily., Disp: , Rfl:     sertraline (ZOLOFT) 100 MG tablet, Take 100 mg by mouth once daily., Disp: , Rfl:    Review of Systems   Constitutional:  Negative for appetite change and unexpected weight change.   HENT:  Negative for mouth sores.    Eyes:  Negative for visual disturbance.   Respiratory:  Negative for cough and shortness of breath.    Cardiovascular:  Positive for chest pain.   Gastrointestinal:  Negative for abdominal pain and diarrhea.   Genitourinary:  Negative for frequency.   Musculoskeletal:  Negative for back pain.   Skin:  Negative for rash.   Neurological:  Positive for headaches.   Hematological:  Negative for adenopathy.   Psychiatric/Behavioral:  The patient is not nervous/anxious.         Objective:     There were no  vitals filed for this visit.        Physical Exam  Constitutional:       Appearance: Normal appearance.   Pulmonary:      Effort: Pulmonary effort is normal.   Neurological:      Mental Status: She is alert and oriented to person, place, and time.   Psychiatric:         Mood and Affect: Mood normal.       ECOG SCORE              Assessment/Plan:   Iron deficiency anemia   Iron deficiency: Refractory to prenatal and oral iron --stable  Now status post 1 dose of IV iron with a hemoglobin improved to 11.4.  She did have a reaction to the Injectafer and a 2nd reaction  On 2nd dose despite premedications    2.Dysfunctional uterine bleeding  Low von Willebrand's   Normal platelet aggregation studies, waxing and waning von Willebrand's factors normal multimers.  Elevated PTT, no evidence of lupus anticoagulant  With the hematuria will not try TXA   While she has had no significant bleeding with her C-sections or her other surgeries, she has had urinary bleeding.  She is scheduled for a cystoscopy and biopsy.    Instead of tranexamic acid I would recommend DDAVP 0.3 mcg, max at 20 mcg  30 minutes prior to surgery.            PLAN:   Oral iron Dc'd due to intolerance  Iron rich diet   Continue follow-up with gyn to control her cycles    IV venofer or ferrelicit, if needs IV iron again and ferritin less than 50- Premed 650 tylenol  She was given DDAVP prior to her procedure.  She reports she still has some bleeding after.    She still has dysfunctional uterine bleeding according to the patient.    If you look at her von Willebrand's levels, they went up significantly prior and after DDAVP challenge.    However her only problem has been her abnormal PTT.    Her von Willebrand's numbers have been all over, more recently her factor 8 activity levels have been high.    It is only her older testing that was slightly abnormal.    I would recommend she come back in 6 weeks and from her iron deficiency recheck her iron panels,  continue discuss with gyn control her cycles.    She can use TXA if need be.  With cycle bleeding.  I would not use this with hematuria because of its risk    Will return to clinic in 6 weeks with CBC, iron panel ferritin, PT PTT mixing study, and platelet function        Follow up in about 7 weeks (around 5/21/2024) for labs before .        Telemed: This visit was a telemedicine/telephone visit.  Real-time audio and video were used following [facility] protocols.  The patient and/or family agreed to the security of information at the location.  I was located in the [facility] with this visit occurred.  The patient was located.  At home  I am licensed to practice in the [facility].  Total time in review of labs and discussion with this patient 20 minutes

## 2024-04-02 ENCOUNTER — OFFICE VISIT (OUTPATIENT)
Dept: HEMATOLOGY/ONCOLOGY | Facility: CLINIC | Age: 24
End: 2024-04-02
Payer: COMMERCIAL

## 2024-04-02 DIAGNOSIS — D50.9 IRON DEFICIENCY ANEMIA, UNSPECIFIED IRON DEFICIENCY ANEMIA TYPE: Primary | ICD-10-CM

## 2024-04-02 DIAGNOSIS — R79.1 ELEVATED PARTIAL THROMBOPLASTIN TIME (PTT): ICD-10-CM

## 2024-04-02 PROCEDURE — 1160F RVW MEDS BY RX/DR IN RCRD: CPT | Mod: CPTII,95,, | Performed by: INTERNAL MEDICINE

## 2024-04-02 PROCEDURE — 99213 OFFICE O/P EST LOW 20 MIN: CPT | Mod: 95,,, | Performed by: INTERNAL MEDICINE

## 2024-04-02 PROCEDURE — 1159F MED LIST DOCD IN RCRD: CPT | Mod: CPTII,95,, | Performed by: INTERNAL MEDICINE

## 2024-05-30 ENCOUNTER — PATIENT MESSAGE (OUTPATIENT)
Dept: HEMATOLOGY/ONCOLOGY | Facility: CLINIC | Age: 24
End: 2024-05-30

## 2024-06-03 NOTE — PROGRESS NOTES
Subjective:        PATIENT: Ceci Abbott  MRN: [id number]  DATE: 2024  Chief Complaint: Follow-up (7 week virtual visit)    Referring group:  Nani Antony  Reason for referral anemia     Current Therapy: S/p one dose of injectafer--reaction    Clinical History:  This is a 22-year-old female who was seen by her primary care team  On laboratory review on 2022 patients have an iron of 27 saturation of 7, a ferritin of 4.8, her hemoglobin was 10, the platelet count of 390, white cell count of 10.  She is noted to have a normal BUN and creatinine.  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  She has dysfunctional uterine bleeding where she is been through multiple different options.  She continues to have PICA for ice, she has chronic heartburn and reflux.  In fact she had a scope when she was younger because of chronic reflux.    She is scheduled to see Gastroenterology.    She continues to breastfeed.  (the baby is 16 months)  Status post 1 dose of IV iron, attempted 2nd dose of IV iron continued to have chest pain and shortness of breath, hemoglobin improved after 1 dose,    2024  The patient location is: home  Visit type: audiovisual    25 minutes of total time spent on the encounter, which includes face to face time and non-face to face time preparing to see the patient (eg, review of tests), Obtaining and/or reviewing separately obtained history, Documenting clinical information in the electronic or other health record, Independently interpreting results (not separately reported) and communicating results to the patient/family/caregiver, or Care coordination (not separately reported).     Each patient to whom he or she provides medical services by telemedicine  is:  (1) informed of the relationship between the physician and patient and the respective role of any other health care provider with respect to management of the patient; and (2) notified that he or she may decline to receive medical services by telemedicine and may withdraw from such care at any time.    Notes:  Patient presents for follow up visit for NIGEL secondary to DUB. Since her last visit, she has confirmed endometriosis. She is on her cycle currently. Her cycles lasts 10 days and she uses 1 ultra tampon every hour. Her labs from April show a ferritin of 6, hgb, 11.3, with 2 cycles since then, I would anticipate these numbers to be even lower.     Past Medical History:   Diagnosis Date    Depression     Hearing loss in left ear       Past Surgical History:   Procedure Laterality Date    ADENOIDECTOMY       SECTION      x2    eardrum reconstruction      SINUS SURGERY      TONSILLECTOMY        Family History   Problem Relation Name Age of Onset    Cancer Mother Leanne     Depression Mother Leanne     Hypertension Mother Leanne     Kidney disease Mother Leanne     Miscarriages / Stillbirths Mother Leanne     Arthritis Father Walter     Hearing loss Maternal Grandfather Lester     Heart disease Maternal Grandfather Lester     Hypertension Maternal Grandfather Lester     Arthritis Maternal Grandmother Annabella     Diabetes Maternal Grandmother Annabella     Drug abuse Maternal Grandmother Annabella       Social History     Socioeconomic History    Marital status:    Tobacco Use    Smoking status: Never    Smokeless tobacco: Never   Substance and Sexual Activity    Alcohol use: Never    Drug use: Never    Sexual activity: Yes     Partners: Male     Birth control/protection: OCP      Review of patient's allergies indicates:   Allergen Reactions    Injectafer [ferric carboxymaltose] Shortness Of Breath and Palpitations    Shellfish containing products      Current Outpatient Medications:     desmopressin (STIMATE) 150  mcg/spray (0.1 mL) nasal spray, 1 spray (150 mcg total) by Left Nostril route As instructed (once if stignificant bleeding)., Disp: 150 mL, Rfl: 0    dextroamphetamine-amphetamine (ADDERALL XR) 20 MG 24 hr capsule, Take by mouth daily as needed., Disp: , Rfl:     norethindrone (MICRONOR) 0.35 mg tablet, Take 1 tablet by mouth once daily., Disp: , Rfl:     omeprazole (PRILOSEC) 40 MG capsule, , Disp: , Rfl:     propranoloL (INDERAL) 10 MG tablet, Take 10 mg by mouth once daily., Disp: , Rfl:     sertraline (ZOLOFT) 100 MG tablet, Take 100 mg by mouth once daily., Disp: , Rfl:    Review of Systems   Constitutional:  Negative for appetite change and unexpected weight change.   HENT:  Negative for mouth sores.    Eyes:  Negative for visual disturbance.   Respiratory:  Negative for cough and shortness of breath.    Cardiovascular:  Positive for chest pain.   Gastrointestinal:  Negative for abdominal pain and diarrhea.   Genitourinary:  Negative for frequency.   Musculoskeletal:  Negative for back pain.   Skin:  Negative for rash.   Neurological:  Positive for headaches.   Hematological:  Negative for adenopathy.   Psychiatric/Behavioral:  The patient is not nervous/anxious.         Objective:     There were no vitals filed for this visit.     Physical Exam  Skin:     Coloration: Skin is pale.   Neurological:      General: No focal deficit present.      Mental Status: She is alert and oriented to person, place, and time.   Psychiatric:         Mood and Affect: Mood normal.         ECOG SCORE            Assessment/Plan:   Iron deficiency anemia   Iron deficiency: Refractory to prenatal and oral iron   Now status post 1 dose of IV iron with a hemoglobin improved to 11.4.  She did have a reaction to the Injectafer and a 2nd reaction  On 2nd dose despite premedications    2.Dysfunctional uterine bleeding  Low von Willebrand's   Normal platelet aggregation studies, waxing and waning von Willebrand's factors normal multimers.   Elevated PTT, no evidence of lupus anticoagulant  With the hematuria will not try TXA   While she has had no significant bleeding with her C-sections or her other surgeries, she has had urinary bleeding.  She is scheduled for a cystoscopy and biopsy.    Instead of tranexamic acid I would recommend DDAVP 0.3 mcg, max at 20 mcg  30 minutes prior to surgery.        PLAN:  Oral iron not tolerated  Reaction to injectafer  Discussed with pharmacy and the shortage. Will use infed as patient lives 2 hours away. Set up for infed and premedicate with tylenol and benadryl  Iron rich diet   Continue follow-up with gyn to control her cycles        Repeat CBC and iron panel and ferritin prior to IV iron     Follow up in about 8 weeks (around 7/30/2024) for T/M visit with CBC, iron panel and ferritin prior to visit at Health system.

## 2024-06-04 ENCOUNTER — OFFICE VISIT (OUTPATIENT)
Dept: HEMATOLOGY/ONCOLOGY | Facility: CLINIC | Age: 24
End: 2024-06-04
Payer: COMMERCIAL

## 2024-06-04 DIAGNOSIS — D50.9 IRON DEFICIENCY ANEMIA, UNSPECIFIED IRON DEFICIENCY ANEMIA TYPE: Primary | ICD-10-CM

## 2024-06-04 DIAGNOSIS — R79.1 ELEVATED PARTIAL THROMBOPLASTIN TIME (PTT): ICD-10-CM

## 2024-06-04 PROCEDURE — 99213 OFFICE O/P EST LOW 20 MIN: CPT | Mod: 95,,, | Performed by: NURSE PRACTITIONER

## 2024-06-04 PROCEDURE — 1159F MED LIST DOCD IN RCRD: CPT | Mod: CPTII,95,, | Performed by: NURSE PRACTITIONER

## 2024-06-04 PROCEDURE — 1160F RVW MEDS BY RX/DR IN RCRD: CPT | Mod: CPTII,95,, | Performed by: NURSE PRACTITIONER

## 2024-06-13 ENCOUNTER — INFUSION (OUTPATIENT)
Dept: INFUSION THERAPY | Facility: HOSPITAL | Age: 24
End: 2024-06-13
Attending: INTERNAL MEDICINE
Payer: COMMERCIAL

## 2024-06-13 ENCOUNTER — LAB VISIT (OUTPATIENT)
Dept: LAB | Facility: HOSPITAL | Age: 24
End: 2024-06-13
Attending: INTERNAL MEDICINE
Payer: COMMERCIAL

## 2024-06-13 VITALS — SYSTOLIC BLOOD PRESSURE: 114 MMHG | DIASTOLIC BLOOD PRESSURE: 76 MMHG | HEART RATE: 61 BPM

## 2024-06-13 DIAGNOSIS — D50.0 IRON DEFICIENCY ANEMIA DUE TO CHRONIC BLOOD LOSS: Primary | ICD-10-CM

## 2024-06-13 DIAGNOSIS — D50.9 IRON DEFICIENCY ANEMIA, UNSPECIFIED IRON DEFICIENCY ANEMIA TYPE: ICD-10-CM

## 2024-06-13 LAB
BASOPHILS # BLD AUTO: 0.02 X10(3)/MCL
BASOPHILS NFR BLD AUTO: 0.3 %
EOSINOPHIL # BLD AUTO: 0.36 X10(3)/MCL (ref 0–0.9)
EOSINOPHIL NFR BLD AUTO: 5.6 %
ERYTHROCYTE [DISTWIDTH] IN BLOOD BY AUTOMATED COUNT: 13.2 % (ref 11.5–17)
FERRITIN SERPL-MCNC: 13.23 NG/ML (ref 4.63–204)
HCT VFR BLD AUTO: 33.4 % (ref 37–47)
HGB BLD-MCNC: 11.2 G/DL (ref 12–16)
IMM GRANULOCYTES # BLD AUTO: 0 X10(3)/MCL (ref 0–0.04)
IMM GRANULOCYTES NFR BLD AUTO: 0 %
IRON SATN MFR SERPL: 14 % (ref 20–50)
IRON SERPL-MCNC: 37 UG/DL (ref 50–170)
LYMPHOCYTES # BLD AUTO: 3.31 X10(3)/MCL (ref 0.6–4.6)
LYMPHOCYTES NFR BLD AUTO: 51.4 %
MCH RBC QN AUTO: 28.4 PG (ref 27–31)
MCHC RBC AUTO-ENTMCNC: 33.5 G/DL (ref 33–36)
MCV RBC AUTO: 84.6 FL (ref 80–94)
MONOCYTES # BLD AUTO: 0.34 X10(3)/MCL (ref 0.1–1.3)
MONOCYTES NFR BLD AUTO: 5.3 %
NEUTROPHILS # BLD AUTO: 2.41 X10(3)/MCL (ref 2.1–9.2)
NEUTROPHILS NFR BLD AUTO: 37.4 %
PLATELET # BLD AUTO: 347 X10(3)/MCL (ref 130–400)
PMV BLD AUTO: 9 FL (ref 7.4–10.4)
RBC # BLD AUTO: 3.95 X10(6)/MCL (ref 4.2–5.4)
TIBC SERPL-MCNC: 235 UG/DL (ref 70–310)
TIBC SERPL-MCNC: 272 UG/DL (ref 250–450)
TRANSFERRIN SERPL-MCNC: 258 MG/DL (ref 180–382)
WBC # SPEC AUTO: 6.44 X10(3)/MCL (ref 4.5–11.5)

## 2024-06-13 PROCEDURE — 96365 THER/PROPH/DIAG IV INF INIT: CPT

## 2024-06-13 PROCEDURE — 36415 COLL VENOUS BLD VENIPUNCTURE: CPT

## 2024-06-13 PROCEDURE — 96367 TX/PROPH/DG ADDL SEQ IV INF: CPT

## 2024-06-13 PROCEDURE — 82728 ASSAY OF FERRITIN: CPT

## 2024-06-13 PROCEDURE — 96368 THER/DIAG CONCURRENT INF: CPT

## 2024-06-13 PROCEDURE — 25000003 PHARM REV CODE 250: Performed by: INTERNAL MEDICINE

## 2024-06-13 PROCEDURE — 83540 ASSAY OF IRON: CPT

## 2024-06-13 PROCEDURE — 63600175 PHARM REV CODE 636 W HCPCS: Performed by: INTERNAL MEDICINE

## 2024-06-13 PROCEDURE — 85025 COMPLETE CBC W/AUTO DIFF WBC: CPT

## 2024-06-13 PROCEDURE — 96366 THER/PROPH/DIAG IV INF ADDON: CPT

## 2024-06-13 RX ORDER — SODIUM CHLORIDE 0.9 % (FLUSH) 0.9 %
10 SYRINGE (ML) INJECTION
Status: DISCONTINUED | OUTPATIENT
Start: 2024-06-13 | End: 2024-06-13 | Stop reason: HOSPADM

## 2024-06-13 RX ORDER — DIPHENHYDRAMINE HYDROCHLORIDE 50 MG/ML
50 INJECTION INTRAMUSCULAR; INTRAVENOUS ONCE AS NEEDED
Status: DISCONTINUED | OUTPATIENT
Start: 2024-06-13 | End: 2024-06-13 | Stop reason: HOSPADM

## 2024-06-13 RX ORDER — ACETAMINOPHEN 325 MG/1
650 TABLET ORAL
Status: COMPLETED | OUTPATIENT
Start: 2024-06-13 | End: 2024-06-13

## 2024-06-13 RX ORDER — HEPARIN 100 UNIT/ML
500 SYRINGE INTRAVENOUS
Status: DISCONTINUED | OUTPATIENT
Start: 2024-06-13 | End: 2024-06-13 | Stop reason: HOSPADM

## 2024-06-13 RX ORDER — EPINEPHRINE 0.3 MG/.3ML
0.3 INJECTION SUBCUTANEOUS ONCE AS NEEDED
Status: DISCONTINUED | OUTPATIENT
Start: 2024-06-13 | End: 2024-06-13 | Stop reason: HOSPADM

## 2024-06-13 RX ADMIN — DIPHENHYDRAMINE HYDROCHLORIDE 50 MG: 50 INJECTION INTRAMUSCULAR; INTRAVENOUS at 10:06

## 2024-06-13 RX ADMIN — ACETAMINOPHEN 650 MG: 325 TABLET ORAL at 10:06

## 2024-06-13 RX ADMIN — FAMOTIDINE 20 MG: 10 INJECTION INTRAVENOUS at 11:06

## 2024-06-13 RX ADMIN — SODIUM CHLORIDE 25 MG: 9 INJECTION, SOLUTION INTRAVENOUS at 11:06

## 2024-06-13 RX ADMIN — SODIUM CHLORIDE 725 MG: 9 INJECTION, SOLUTION INTRAVENOUS at 11:06

## 2024-07-11 PROBLEM — R10.13 EPIGASTRIC PAIN: Status: ACTIVE | Noted: 2024-07-11

## 2024-07-14 ENCOUNTER — PATIENT MESSAGE (OUTPATIENT)
Dept: HEMATOLOGY/ONCOLOGY | Facility: CLINIC | Age: 24
End: 2024-07-14
Payer: COMMERCIAL

## 2024-09-12 NOTE — PROGRESS NOTES
Subjective:        PATIENT: Ceci Abbott  MRN: [id number]  DATE: 2024  Chief Complaint: Follow-up (8 week follow up for lab review)    Referring group:  Nani Antony  Reason for referral anemia     Current Therapy: S/p one dose of injectafer--reaction    Clinical History:  This is a 22-year-old female who was seen by her primary care team  On laboratory review on 2022 patients have an iron of 27 saturation of 7, a ferritin of 4.8, her hemoglobin was 10, the platelet count of 390, white cell count of 10.  She is noted to have a normal BUN and creatinine.  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  She has dysfunctional uterine bleeding where she is been through multiple different options.  She continues to have PICA for ice, she has chronic heartburn and reflux.  In fact she had a scope when she was younger because of chronic reflux.    She is scheduled to see Gastroenterology.    She continues to breastfeed.  (the baby is 16 months)  Status post 1 dose of IV iron, attempted 2nd dose of IV iron continued to have chest pain and shortness of breath, hemoglobin improved after 1 dose,    2024  The patient location is: home  Visit type: audiovisual    25 minutes of total time spent on the encounter, which includes face to face time and non-face to face time preparing to see the patient (eg, review of tests), Obtaining and/or reviewing separately obtained history, Documenting clinical information in the electronic or other health record, Independently interpreting results (not separately reported) and communicating results to the patient/family/caregiver, or Care coordination (not separately reported).     Each patient to whom he or she provides medical services by  telemedicine is:  (1) informed of the relationship between the physician and patient and the respective role of any other health care provider with respect to management of the patient; and (2) notified that he or she may decline to receive medical services by telemedicine and may withdraw from such care at any time.    Notes:  Patient presents for follow up visit for NIGEL secondary to DUB. Since her last visit, she has confirmed endometriosis. She has also had cholecystectomy.  She continues to have heavy cycles, now twice a month. She is taking txa TID by her gynecologist.    Past Medical History:   Diagnosis Date    Colicky RUQ abdominal pain     Depression     Endometriosis, unspecified     Hearing loss in left ear     Nausea vomiting and diarrhea       Past Surgical History:   Procedure Laterality Date    ADENOIDECTOMY      CERVICAL POLYPECTOMY  2024     SECTION      x2    eardrum reconstruction      SINUS SURGERY      TONSILLECTOMY        Family History   Problem Relation Name Age of Onset    Cancer Mother Leanne     Depression Mother Leanne     Hypertension Mother Leanne     Kidney disease Mother Leanne     Miscarriages / Stillbirths Mother Leanne     Arthritis Father Walter     Hearing loss Maternal Grandfather Lester     Heart disease Maternal Grandfather Lester     Hypertension Maternal Grandfather Lester     Arthritis Maternal Grandmother Annabella     Diabetes Maternal Grandmother Annabella     Drug abuse Maternal Grandmother Annabella       Social History     Socioeconomic History    Marital status:    Tobacco Use    Smoking status: Never    Smokeless tobacco: Never   Substance and Sexual Activity    Alcohol use: Never    Drug use: Never    Sexual activity: Yes     Partners: Male     Birth control/protection: OCP      Review of patient's allergies indicates:   Allergen Reactions    Injectafer [ferric carboxymaltose] Shortness Of Breath and Palpitations    Shellfish containing products      Current Outpatient  Medications:     desmopressin (STIMATE) 150 mcg/spray (0.1 mL) nasal spray, 1 spray (150 mcg total) by Left Nostril route As instructed (once if stignificant bleeding)., Disp: 150 mL, Rfl: 0    dextroamphetamine-amphetamine (ADDERALL XR) 20 MG 24 hr capsule, Take by mouth daily as needed., Disp: , Rfl:     hyoscyamine 0.125 mg Subl, Place 1 tablet (0.125 mg total) under the tongue every 6 (six) hours as needed (as needed abdominal pain)., Disp: 100 tablet, Rfl: 1    norethindrone (MICRONOR) 0.35 mg tablet, Take 1 tablet by mouth once daily., Disp: , Rfl:     omeprazole (PRILOSEC) 40 MG capsule, , Disp: , Rfl:     ondansetron (ZOFRAN-ODT) 8 MG TbDL, Take 1 tablet (8 mg total) by mouth every 6 (six) hours as needed (take as needed nausea, vomiting)., Disp: 30 tablet, Rfl: 0    propranoloL (INDERAL) 10 MG tablet, Take 10 mg by mouth once daily., Disp: , Rfl:     sertraline (ZOLOFT) 100 MG tablet, Take 100 mg by mouth once daily., Disp: , Rfl:     sucralfate (CARAFATE) 1 gram tablet, Take 1 tablet (1 g total) by mouth 2 (two) times daily., Disp: 60 tablet, Rfl: 1   Review of Systems   Constitutional:  Negative for appetite change and unexpected weight change.   HENT:  Negative for mouth sores.    Eyes:  Negative for visual disturbance.   Respiratory:  Negative for cough and shortness of breath.    Cardiovascular:  Positive for chest pain.   Gastrointestinal:  Negative for abdominal pain and diarrhea.   Genitourinary:  Negative for frequency.   Musculoskeletal:  Negative for back pain.   Skin:  Negative for rash.   Neurological:  Positive for headaches.   Hematological:  Negative for adenopathy.   Psychiatric/Behavioral:  The patient is not nervous/anxious.         Objective:     There were no vitals filed for this visit.     Physical Exam  Skin:     Coloration: Skin is pale.   Neurological:      General: No focal deficit present.      Mental Status: She is alert and oriented to person, place, and time.   Psychiatric:          Mood and Affect: Mood normal.         ECOG SCORE            Assessment/Plan:   Iron deficiency anemia   Iron deficiency: Refractory to prenatal and oral iron   Now status post 1 dose of IV iron with a hemoglobin improved to 11.4.  She did have a reaction to the Injectafer and a 2nd reaction  On 2nd dose despite premedications    2.Dysfunctional uterine bleeding  Low von Willebrand's   Normal platelet aggregation studies, waxing and waning von Willebrand's factors normal multimers.  Elevated PTT, no evidence of lupus anticoagulant    With the hematuria will not try TXA-now taking given by OG/Gyn 3 pills TID at the start of her cycle.    While she has had no significant bleeding with her C-sections or her other surgeries, she has had urinary bleeding.    She is followed by urology. Had workup with cystocope.     PLAN:  Oral iron not tolerated  Reaction to injectafer  S/P infed   Iron rich diet   Continue follow-up with gyn to control her cycles    Need GN PLT lab when she presents to office in person  She did not do her labs for this visit, she will get them locally then determine if she needs repeat infusion        Follow up in about 1 week (around 9/20/2024) for T/M in 1-2 weeks to discuss lab results. Please email patient lab orders.

## 2024-09-13 ENCOUNTER — OFFICE VISIT (OUTPATIENT)
Dept: HEMATOLOGY/ONCOLOGY | Facility: CLINIC | Age: 24
End: 2024-09-13
Payer: COMMERCIAL

## 2024-09-13 ENCOUNTER — PATIENT MESSAGE (OUTPATIENT)
Dept: HEMATOLOGY/ONCOLOGY | Facility: CLINIC | Age: 24
End: 2024-09-13

## 2024-09-13 VITALS — HEIGHT: 64 IN | BODY MASS INDEX: 29.87 KG/M2

## 2024-09-13 DIAGNOSIS — R79.1 ELEVATED PARTIAL THROMBOPLASTIN TIME (PTT): ICD-10-CM

## 2024-09-13 DIAGNOSIS — D50.9 IRON DEFICIENCY ANEMIA, UNSPECIFIED IRON DEFICIENCY ANEMIA TYPE: Primary | ICD-10-CM

## 2024-09-13 DIAGNOSIS — N93.8 DYSFUNCTIONAL UTERINE BLEEDING: ICD-10-CM

## 2024-09-13 DIAGNOSIS — R79.1 ABNORMAL PARTIAL THROMBOPLASTIN TIME (PTT): ICD-10-CM

## 2024-09-25 ENCOUNTER — PATIENT MESSAGE (OUTPATIENT)
Dept: HEMATOLOGY/ONCOLOGY | Facility: CLINIC | Age: 24
End: 2024-09-25

## 2024-09-25 ENCOUNTER — OFFICE VISIT (OUTPATIENT)
Dept: HEMATOLOGY/ONCOLOGY | Facility: CLINIC | Age: 24
End: 2024-09-25
Payer: COMMERCIAL

## 2024-09-25 DIAGNOSIS — D50.9 IRON DEFICIENCY ANEMIA, UNSPECIFIED IRON DEFICIENCY ANEMIA TYPE: Primary | ICD-10-CM

## 2024-09-25 DIAGNOSIS — R79.1 ELEVATED PARTIAL THROMBOPLASTIN TIME (PTT): ICD-10-CM

## 2024-09-25 DIAGNOSIS — N93.8 DYSFUNCTIONAL UTERINE BLEEDING: ICD-10-CM

## 2024-09-25 PROCEDURE — 1160F RVW MEDS BY RX/DR IN RCRD: CPT | Mod: CPTII,95,, | Performed by: NURSE PRACTITIONER

## 2024-09-25 PROCEDURE — 1159F MED LIST DOCD IN RCRD: CPT | Mod: CPTII,95,, | Performed by: NURSE PRACTITIONER

## 2024-09-25 PROCEDURE — 99213 OFFICE O/P EST LOW 20 MIN: CPT | Mod: 95,,, | Performed by: NURSE PRACTITIONER

## 2024-09-25 NOTE — PROGRESS NOTES
Subjective:        PATIENT: Ceci Abbott  MRN: [id number]  DATE: 2024  Chief Complaint: No chief complaint on file.    Referring group:  Nani Antony  Reason for referral anemia     Current Therapy: Iron dextran, last dose in 2024    Clinical History:  This is a 22-year-old female who was seen by her primary care team  On laboratory review on 2022 patients have an iron of 27 saturation of 7, a ferritin of 4.8, her hemoglobin was 10, the platelet count of 390, white cell count of 10.  She is noted to have a normal BUN and creatinine.  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  She has dysfunctional uterine bleeding where she is been through multiple different options.  She continues to have PICA for ice, she has chronic heartburn and reflux.  In fact she had a scope when she was younger because of chronic reflux.    She is scheduled to see Gastroenterology.    She continues to breastfeed.  (the baby is 16 months)  Status post 1 dose of IV iron, attempted 2nd dose of IV iron continued to have chest pain and shortness of breath, hemoglobin improved after 1 dose,    2024  The patient location is: home  Visit type: audiovisual    20 minutes of total time spent on the encounter, which includes face to face time and non-face to face time preparing to see the patient (eg, review of tests), Obtaining and/or reviewing separately obtained history, Documenting clinical information in the electronic or other health record, Independently interpreting results (not separately reported) and communicating results to the patient/family/caregiver, or Care coordination (not separately reported).     Each patient to whom he or she provides medical services by telemedicine is:   (1) informed of the relationship between the physician and patient and the respective role of any other health care provider with respect to management of the patient; and (2) notified that he or she may decline to receive medical services by telemedicine and may withdraw from such care at any time.    Notes:  Patient presents for follow up visit for NIGEL secondary to DUB. Since her last visit, she has confirmed endometriosis. She has also had cholecystectomy.  She continues to have heavy cycles, now twice a month. She is taking txa TID by her gynecologist.      Labs reviewed. Iron sat still low at 15, ferritin 82, hbg 12.2. Continues with DUB. Her cycles are heavy and last about 10 days.   Past Medical History:   Diagnosis Date    Colicky RUQ abdominal pain     Depression     Endometriosis, unspecified     Hearing loss in left ear     Nausea vomiting and diarrhea       Past Surgical History:   Procedure Laterality Date    ADENOIDECTOMY      CERVICAL POLYPECTOMY  2024     SECTION      x2    eardrum reconstruction      SINUS SURGERY      TONSILLECTOMY        Family History   Problem Relation Name Age of Onset    Cancer Mother Leanne     Depression Mother Leanne     Hypertension Mother Leanne     Kidney disease Mother Leanne     Miscarriages / Stillbirths Mother Leanne     Arthritis Father Walter     Hearing loss Maternal Grandfather Lester     Heart disease Maternal Grandfather Lester     Hypertension Maternal Grandfather Lester     Arthritis Maternal Grandmother Annabella     Diabetes Maternal Grandmother Annabella     Drug abuse Maternal Grandmother Annabella       Social History     Socioeconomic History    Marital status:    Tobacco Use    Smoking status: Never    Smokeless tobacco: Never   Substance and Sexual Activity    Alcohol use: Never    Drug use: Never    Sexual activity: Yes     Partners: Male     Birth control/protection: OCP      Review of patient's allergies indicates:   Allergen Reactions    Injectafer  [ferric carboxymaltose] Shortness Of Breath and Palpitations    Shellfish containing products      Current Outpatient Medications:     desmopressin (STIMATE) 150 mcg/spray (0.1 mL) nasal spray, 1 spray (150 mcg total) by Left Nostril route As instructed (once if stignificant bleeding)., Disp: 150 mL, Rfl: 0    dextroamphetamine-amphetamine (ADDERALL XR) 20 MG 24 hr capsule, Take by mouth daily as needed., Disp: , Rfl:     hyoscyamine 0.125 mg Subl, Place 1 tablet (0.125 mg total) under the tongue every 6 (six) hours as needed (as needed abdominal pain)., Disp: 100 tablet, Rfl: 1    norethindrone (MICRONOR) 0.35 mg tablet, Take 1 tablet by mouth once daily., Disp: , Rfl:     omeprazole (PRILOSEC) 40 MG capsule, , Disp: , Rfl:     ondansetron (ZOFRAN-ODT) 8 MG TbDL, Take 1 tablet (8 mg total) by mouth every 6 (six) hours as needed (take as needed nausea, vomiting)., Disp: 30 tablet, Rfl: 0    propranoloL (INDERAL) 10 MG tablet, Take 10 mg by mouth once daily., Disp: , Rfl:     sertraline (ZOLOFT) 100 MG tablet, Take 100 mg by mouth once daily., Disp: , Rfl:     sucralfate (CARAFATE) 1 gram tablet, Take 1 tablet (1 g total) by mouth 2 (two) times daily., Disp: 60 tablet, Rfl: 1   Review of Systems   Constitutional:  Negative for appetite change and unexpected weight change.   HENT:  Negative for mouth sores.    Eyes:  Negative for visual disturbance.   Respiratory:  Negative for cough and shortness of breath.    Cardiovascular:  Positive for chest pain.   Gastrointestinal:  Negative for abdominal pain and diarrhea.   Genitourinary:  Negative for frequency.   Musculoskeletal:  Negative for back pain.   Skin:  Negative for rash.   Neurological:  Positive for headaches.   Hematological:  Negative for adenopathy.   Psychiatric/Behavioral:  The patient is not nervous/anxious.         Objective:     There were no vitals filed for this visit.     Physical Exam  Skin:     Coloration: Skin is pale.   Neurological:      General:  No focal deficit present.      Mental Status: She is alert and oriented to person, place, and time.   Psychiatric:         Mood and Affect: Mood normal.         ECOG SCORE            Assessment/Plan:   Iron deficiency anemia   Iron deficiency: Refractory to prenatal and oral iron   Now status post 1 dose of IV iron with a hemoglobin improved to 11.4.  She did have a reaction to the Injectafer and a 2nd reaction  On 2nd dose despite premedications    2.Dysfunctional uterine bleeding  Low von Willebrand's   Normal platelet aggregation studies, waxing and waning von Willebrand's factors normal multimers.  Elevated PTT, no evidence of lupus anticoagulant  She is on TXA with her GYN. Taking 3 pills TID at the start of her cycle. She did not notice much difference in her cycles.    PLAN:  Oral iron not tolerated  Reaction to injectafer  S/P infed on 6/13/24  Iron rich diet   Continue follow-up with gyn to control her cycles    Need GN PLT lab when she presents to office in person        Follow up for T/M visit in 6 weeks with CBC/iron profile/ferritin prior to appt locally. Email patient lab orders.

## 2024-11-05 ENCOUNTER — PATIENT MESSAGE (OUTPATIENT)
Dept: HEMATOLOGY/ONCOLOGY | Facility: CLINIC | Age: 24
End: 2024-11-05
Payer: COMMERCIAL

## 2024-11-19 ENCOUNTER — PATIENT MESSAGE (OUTPATIENT)
Dept: HEMATOLOGY/ONCOLOGY | Facility: CLINIC | Age: 24
End: 2024-11-19
Payer: COMMERCIAL

## 2024-12-04 ENCOUNTER — OFFICE VISIT (OUTPATIENT)
Dept: HEMATOLOGY/ONCOLOGY | Facility: CLINIC | Age: 24
End: 2024-12-04
Payer: COMMERCIAL

## 2024-12-04 DIAGNOSIS — D50.9 IRON DEFICIENCY ANEMIA, UNSPECIFIED IRON DEFICIENCY ANEMIA TYPE: Primary | ICD-10-CM

## 2024-12-04 DIAGNOSIS — N93.8 DYSFUNCTIONAL UTERINE BLEEDING: ICD-10-CM

## 2024-12-04 DIAGNOSIS — R79.1 ELEVATED PARTIAL THROMBOPLASTIN TIME (PTT): ICD-10-CM

## 2024-12-04 DIAGNOSIS — R11.2 INCREASED NAUSEA AND VOMITING: Primary | ICD-10-CM

## 2024-12-04 PROCEDURE — 1159F MED LIST DOCD IN RCRD: CPT | Mod: CPTII,95,, | Performed by: NURSE PRACTITIONER

## 2024-12-04 PROCEDURE — 1160F RVW MEDS BY RX/DR IN RCRD: CPT | Mod: CPTII,95,, | Performed by: NURSE PRACTITIONER

## 2024-12-04 PROCEDURE — 99213 OFFICE O/P EST LOW 20 MIN: CPT | Mod: 95,,, | Performed by: NURSE PRACTITIONER

## 2024-12-04 RX ORDER — SODIUM CHLORIDE 0.9 % (FLUSH) 0.9 %
10 SYRINGE (ML) INJECTION
OUTPATIENT
Start: 2024-12-09

## 2024-12-04 RX ORDER — EPINEPHRINE 0.3 MG/.3ML
0.3 INJECTION SUBCUTANEOUS ONCE AS NEEDED
OUTPATIENT
Start: 2024-12-09 | End: 2036-05-06

## 2024-12-04 RX ORDER — DIPHENHYDRAMINE HYDROCHLORIDE 50 MG/ML
50 INJECTION INTRAMUSCULAR; INTRAVENOUS ONCE AS NEEDED
OUTPATIENT
Start: 2024-12-09 | End: 2036-05-06

## 2024-12-04 RX ORDER — ACETAMINOPHEN 325 MG/1
650 TABLET ORAL
OUTPATIENT
Start: 2024-12-09

## 2024-12-04 RX ORDER — HEPARIN 100 UNIT/ML
500 SYRINGE INTRAVENOUS
OUTPATIENT
Start: 2024-12-09

## 2024-12-04 NOTE — PROGRESS NOTES
Subjective:        PATIENT: Ceci Abbott  MRN: [id number]  DATE: 2024  Chief Complaint: Follow-up    Referring group:  Nani Antony  Reason for referral anemia     Current Therapy: Iron dextran, last dose in 2024    Clinical History:  This is a 22-year-old female who was seen by her primary care team  On laboratory review on 2022 patients have an iron of 27 saturation of 7, a ferritin of 4.8, her hemoglobin was 10, the platelet count of 390, white cell count of 10.  She is noted to have a normal BUN and creatinine.  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  She has dysfunctional uterine bleeding where she is been through multiple different options.  She continues to have PICA for ice, she has chronic heartburn and reflux.  In fact she had a scope when she was younger because of chronic reflux.    She is scheduled to see Gastroenterology.    She continues to breastfeed.  (the baby is 16 months)  Status post 1 dose of IV iron, attempted 2nd dose of IV iron continued to have chest pain and shortness of breath, hemoglobin improved after 1 dose,    2024  The patient location is: home  Visit type: audiovisual    20 minutes of total time spent on the encounter, which includes face to face time and non-face to face time preparing to see the patient (eg, review of tests), Obtaining and/or reviewing separately obtained history, Documenting clinical information in the electronic or other health record, Independently interpreting results (not separately reported) and communicating results to the patient/family/caregiver, or Care coordination (not separately reported).     Each patient to whom he or she provides medical services by telemedicine is:  (1) informed of  the relationship between the physician and patient and the respective role of any other health care provider with respect to management of the patient; and (2) notified that he or she may decline to receive medical services by telemedicine and may withdraw from such care at any time.    Notes:  Patient presents for follow up visit for NIGEL secondary to DUB. Since her last visit, she has confirmed endometriosis. She has also had cholecystectomy.  She continues to have heavy cycles, now twice a month. She is taking txa TID by her gynecologist.      Labs reviewed. Continues with DUB. Her cycles are heavy and last about 10 days.   Past Medical History:   Diagnosis Date    Colicky RUQ abdominal pain     Depression     Endometriosis, unspecified     Hearing loss in left ear     Nausea vomiting and diarrhea       Past Surgical History:   Procedure Laterality Date    ADENOIDECTOMY      CERVICAL POLYPECTOMY  2024     SECTION      x2    eardrum reconstruction      SINUS SURGERY      TONSILLECTOMY        Family History   Problem Relation Name Age of Onset    Cancer Mother Leanne     Depression Mother Leanne     Hypertension Mother Leanne     Kidney disease Mother Leanne     Miscarriages / Stillbirths Mother Leanne     Arthritis Father Walter     Hearing loss Maternal Grandfather Lester     Heart disease Maternal Grandfather Lester     Hypertension Maternal Grandfather Lester     Arthritis Maternal Grandmother Annabella     Diabetes Maternal Grandmother Annabella     Drug abuse Maternal Grandmother Annabella       Social History     Socioeconomic History    Marital status:    Tobacco Use    Smoking status: Never    Smokeless tobacco: Never   Substance and Sexual Activity    Alcohol use: Never    Drug use: Never    Sexual activity: Yes     Partners: Male     Birth control/protection: OCP      Review of patient's allergies indicates:   Allergen Reactions    Injectafer [ferric carboxymaltose] Shortness Of Breath and Palpitations     Shellfish containing products      Current Outpatient Medications:     desmopressin (STIMATE) 150 mcg/spray (0.1 mL) nasal spray, 1 spray (150 mcg total) by Left Nostril route As instructed (once if stignificant bleeding)., Disp: 150 mL, Rfl: 0    dextroamphetamine-amphetamine (ADDERALL XR) 20 MG 24 hr capsule, Take by mouth daily as needed., Disp: , Rfl:     hyoscyamine 0.125 mg Subl, Place 1 tablet (0.125 mg total) under the tongue every 6 (six) hours as needed (as needed abdominal pain)., Disp: 100 tablet, Rfl: 1    metoclopramide HCl (REGLAN) 10 MG tablet, Take 1 tablet (10 mg total) by mouth every 6 (six) hours as needed., Disp: 60 tablet, Rfl: 3    norethindrone (MICRONOR) 0.35 mg tablet, Take 1 tablet by mouth once daily., Disp: , Rfl:     pantoprazole (PROTONIX) 40 MG tablet, Take 1 tablet (40 mg total) by mouth once daily., Disp: 90 tablet, Rfl: 3    propranoloL (INDERAL) 10 MG tablet, Take 10 mg by mouth once daily., Disp: , Rfl:     sertraline (ZOLOFT) 100 MG tablet, Take 100 mg by mouth once daily., Disp: , Rfl:    Review of Systems   Constitutional:  Negative for appetite change and unexpected weight change.   HENT:  Negative for mouth sores.    Eyes:  Negative for visual disturbance.   Respiratory:  Negative for cough and shortness of breath.    Cardiovascular:  Positive for chest pain.   Gastrointestinal:  Negative for abdominal pain and diarrhea.   Genitourinary:  Negative for frequency.   Musculoskeletal:  Negative for back pain.   Skin:  Negative for rash.   Neurological:  Positive for headaches.   Hematological:  Negative for adenopathy.   Psychiatric/Behavioral:  The patient is not nervous/anxious.         Objective:     There were no vitals filed for this visit.     Physical Exam  Skin:     Coloration: Skin is pale.   Neurological:      General: No focal deficit present.      Mental Status: She is alert and oriented to person, place, and time.   Psychiatric:         Mood and Affect: Mood normal.          ECOG SCORE            Assessment/Plan:   Iron deficiency anemia   Iron deficiency: Refractory to prenatal and oral iron   Now status post 1 dose of IV iron with a hemoglobin improved to 11.4.  She did have a reaction to the Injectafer and a 2nd reaction  On 2nd dose despite premedications    2.Dysfunctional uterine bleeding  Low von Willebrand's   Normal platelet aggregation studies, waxing and waning von Willebrand's factors normal multimers.  Elevated PTT, no evidence of lupus anticoagulant  She is on TXA with her GYN. Taking 3 pills TID at the start of her cycle. She did not notice much difference in her cycles.    PLAN:  Oral iron not tolerated  Reaction to injectafer  S/P infed on 6/13/24  Set up for repeat iron with fatigue, palpitations   Iron rich diet   Continue follow-up with gyn to control her cycles    Need GN PLT lab when she presents to office in person        Follow up in about 3 months (around 3/4/2025) for T/M with Dr. Martínez, labs prior.

## 2024-12-09 ENCOUNTER — PATIENT MESSAGE (OUTPATIENT)
Dept: HEMATOLOGY/ONCOLOGY | Facility: CLINIC | Age: 24
End: 2024-12-09
Payer: COMMERCIAL

## 2024-12-09 ENCOUNTER — INFUSION (OUTPATIENT)
Dept: INFUSION THERAPY | Facility: HOSPITAL | Age: 24
End: 2024-12-09
Attending: INTERNAL MEDICINE
Payer: COMMERCIAL

## 2024-12-09 VITALS
TEMPERATURE: 98 F | WEIGHT: 161.88 LBS | BODY MASS INDEX: 27.64 KG/M2 | OXYGEN SATURATION: 98 % | DIASTOLIC BLOOD PRESSURE: 73 MMHG | HEIGHT: 64 IN | HEART RATE: 70 BPM | RESPIRATION RATE: 16 BRPM | SYSTOLIC BLOOD PRESSURE: 107 MMHG

## 2024-12-09 DIAGNOSIS — D50.0 IRON DEFICIENCY ANEMIA DUE TO CHRONIC BLOOD LOSS: Primary | ICD-10-CM

## 2024-12-09 PROCEDURE — 96367 TX/PROPH/DG ADDL SEQ IV INF: CPT

## 2024-12-09 PROCEDURE — 25000003 PHARM REV CODE 250: Performed by: NURSE PRACTITIONER

## 2024-12-09 PROCEDURE — 96365 THER/PROPH/DIAG IV INF INIT: CPT

## 2024-12-09 PROCEDURE — 63600175 PHARM REV CODE 636 W HCPCS: Performed by: NURSE PRACTITIONER

## 2024-12-09 PROCEDURE — 96376 TX/PRO/DX INJ SAME DRUG ADON: CPT

## 2024-12-09 RX ORDER — SODIUM CHLORIDE 0.9 % (FLUSH) 0.9 %
10 SYRINGE (ML) INJECTION
Status: DISCONTINUED | OUTPATIENT
Start: 2024-12-09 | End: 2024-12-09 | Stop reason: HOSPADM

## 2024-12-09 RX ORDER — PROMETHAZINE HYDROCHLORIDE 25 MG/1
25 TABLET ORAL EVERY 4 HOURS
Qty: 30 TABLET | Refills: 0 | Status: SHIPPED | OUTPATIENT
Start: 2024-12-09

## 2024-12-09 RX ORDER — ACETAMINOPHEN 325 MG/1
650 TABLET ORAL
Status: COMPLETED | OUTPATIENT
Start: 2024-12-09 | End: 2024-12-09

## 2024-12-09 RX ORDER — DIPHENHYDRAMINE HYDROCHLORIDE 50 MG/ML
50 INJECTION INTRAMUSCULAR; INTRAVENOUS ONCE AS NEEDED
Status: COMPLETED | OUTPATIENT
Start: 2024-12-09 | End: 2024-12-09

## 2024-12-09 RX ORDER — HEPARIN 100 UNIT/ML
500 SYRINGE INTRAVENOUS
Status: DISCONTINUED | OUTPATIENT
Start: 2024-12-09 | End: 2024-12-09 | Stop reason: HOSPADM

## 2024-12-09 RX ORDER — EPINEPHRINE 0.3 MG/.3ML
0.3 INJECTION SUBCUTANEOUS ONCE AS NEEDED
Status: DISCONTINUED | OUTPATIENT
Start: 2024-12-09 | End: 2024-12-09 | Stop reason: HOSPADM

## 2024-12-09 RX ADMIN — ACETAMINOPHEN 650 MG: 325 TABLET ORAL at 09:12

## 2024-12-09 RX ADMIN — SODIUM CHLORIDE 20 MG: 9 INJECTION, SOLUTION INTRAVENOUS at 10:12

## 2024-12-09 RX ADMIN — SODIUM CHLORIDE 25 MG: 9 INJECTION, SOLUTION INTRAVENOUS at 09:12

## 2024-12-09 RX ADMIN — SODIUM CHLORIDE 725 MG: 9 INJECTION, SOLUTION INTRAVENOUS at 10:12

## 2024-12-09 RX ADMIN — DIPHENHYDRAMINE HYDROCHLORIDE 50 MG: 50 INJECTION INTRAMUSCULAR; INTRAVENOUS at 10:12

## 2024-12-09 RX ADMIN — DIPHENHYDRAMINE HYDROCHLORIDE 50 MG: 50 INJECTION INTRAMUSCULAR; INTRAVENOUS at 09:12

## 2024-12-09 NOTE — PLAN OF CARE
Patient tolerated Infed test dose, 40 minutes into pre-medication patient started to c/o of mild chest pressure per protocol Benadryl 50 mg IVP given. Patient reported relief of s/s within 5 minutes. A total time of 15 minutes of observation was done prior to starting full dose of Infed. No further complaints. Patient tolerated Infed infusion. ANIRUDH Gonzalez and Dr. Russell notified. Discharged home via wheel chair by nurse for additional safety.

## 2024-12-09 NOTE — NURSING
Patient tolerated Infed test dose, 40 minutes into pre-medication patient started to c/o of mild chest pressure per protocol Benadryl 50 mg IVP given. Patient reported relief of s/s within 5 minutes. A total time of 15 minutes of observation was done prior to starting full dose of Infed. Patient tolerated Infed infusion. Lisa Carlson NP notified.Discharged home via wheel chair by nurse for additional safety.

## 2025-03-26 ENCOUNTER — PATIENT MESSAGE (OUTPATIENT)
Dept: HEMATOLOGY/ONCOLOGY | Facility: CLINIC | Age: 25
End: 2025-03-26
Payer: COMMERCIAL

## 2025-03-26 NOTE — PROGRESS NOTES
Subjective:        PATIENT: Ceci Abbott  MRN: [id number]  DATE: 3/28/2025  Chief Complaint: follow up on lab results    Referring group:  Nani Antony  Reason for referral anemia     Current Therapy: Iron dextran, last dose in 2024    Clinical History:  This is a 22-year-old female who was seen by her primary care team  On laboratory review on 2022 patients have an iron of 27 saturation of 7, a ferritin of 4.8, her hemoglobin was 10, the platelet count of 390, white cell count of 10.  She is noted to have a normal BUN and creatinine.  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  She has dysfunctional uterine bleeding where she is been through multiple different options.  She continues to have PICA for ice, she has chronic heartburn and reflux.  In fact she had a scope when she was younger because of chronic reflux.    She is scheduled to see Gastroenterology.    She continues to breastfeed.  (the baby is 16 months)  Status post 1 dose of IV iron, attempted 2nd dose of IV iron continued to have chest pain and shortness of breath, hemoglobin improved after 1 dose,    2025  The patient location is: home  Visit type: audiovisual    20 minutes of total time spent on the encounter, which includes face to face time and non-face to face time preparing to see the patient (eg, review of tests), Obtaining and/or reviewing separately obtained history, Documenting clinical information in the electronic or other health record, Independently interpreting results (not separately reported) and communicating results to the patient/family/caregiver, or Care coordination (not separately reported).     Each patient to whom he or she provides medical services by telemedicine is:   (1) informed of the relationship between the physician and patient and the respective role of any other health care provider with respect to management of the patient; and (2) notified that he or she may decline to receive medical services by telemedicine and may withdraw from such care at any time.    Notes:  Patient presents for follow up visit for NIGEL secondary to DUB. Since her last visit, she has confirmed endometriosis. She has also had cholecystectomy.  Labs reviewed.     Since our last visit the patient was had an endometrial ablation.  She was not responded to TXA or endocrine therapy from her hormonal standpoint or desmopressin.    She continues on progesterone.  She feels well.  She denies any PICA for ice.  Her energy is well.  We reviewed her labs with her today as stated above      Past Medical History:   Diagnosis Date    Colicky RUQ abdominal pain     Depression     Endometriosis, unspecified     Hearing loss in left ear     Nausea vomiting and diarrhea       Past Surgical History:   Procedure Laterality Date    ADENOIDECTOMY      CERVICAL POLYPECTOMY  2024     SECTION      x2    eardrum reconstruction      SINUS SURGERY      TONSILLECTOMY        Family History   Problem Relation Name Age of Onset    Cancer Mother Leanne     Depression Mother Leanne     Hypertension Mother Leanne     Kidney disease Mother Leanne     Miscarriages / Stillbirths Mother Leanne     Arthritis Father Walter     Hearing loss Maternal Grandfather Lester     Heart disease Maternal Grandfather Lester     Hypertension Maternal Grandfather Lester     Arthritis Maternal Grandmother Annabella     Diabetes Maternal Grandmother Annabella     Drug abuse Maternal Grandmother Annabella       Social History     Socioeconomic History    Marital status:    Tobacco Use    Smoking status: Never    Smokeless tobacco: Never   Substance and Sexual Activity    Alcohol use: Never    Drug use: Never    Sexual activity: Yes     Partners: Male     Birth  control/protection: OCP      Review of patient's allergies indicates:   Allergen Reactions    Injectafer [ferric carboxymaltose] Shortness Of Breath and Palpitations    Shellfish containing products      Current Outpatient Medications:     dextroamphetamine-amphetamine (ADDERALL XR) 20 MG 24 hr capsule, Take by mouth daily as needed., Disp: , Rfl:     hyoscyamine 0.125 mg Subl, Place 1 tablet (0.125 mg total) under the tongue every 6 (six) hours as needed (as needed abdominal pain)., Disp: 100 tablet, Rfl: 1    metoclopramide HCl (REGLAN) 10 MG tablet, Take 1 tablet (10 mg total) by mouth every 6 (six) hours as needed., Disp: 60 tablet, Rfl: 3    norethindrone (MICRONOR) 0.35 mg tablet, Take 1 tablet by mouth once daily., Disp: , Rfl:     pantoprazole (PROTONIX) 40 MG tablet, Take 1 tablet (40 mg total) by mouth once daily., Disp: 90 tablet, Rfl: 3    promethazine (PHENERGAN) 25 MG tablet, Take 1 tablet (25 mg total) by mouth every 4 (four) hours., Disp: 30 tablet, Rfl: 0    propranoloL (INDERAL) 10 MG tablet, Take 10 mg by mouth once daily., Disp: , Rfl:     sertraline (ZOLOFT) 100 MG tablet, Take 100 mg by mouth once daily., Disp: , Rfl:    Review of Systems   Constitutional:  Negative for appetite change and unexpected weight change.   HENT:  Negative for mouth sores.    Eyes:  Negative for visual disturbance.   Respiratory:  Positive for shortness of breath. Negative for cough.    Cardiovascular:  Negative for chest pain.   Gastrointestinal:  Negative for abdominal pain and diarrhea.   Genitourinary:  Negative for frequency.   Musculoskeletal:  Negative for back pain.   Skin:  Negative for rash.   Neurological:  Negative for headaches.   Hematological:  Negative for adenopathy.   Psychiatric/Behavioral:  The patient is not nervous/anxious.         Objective:     There were no vitals filed for this visit.     Physical Exam  Neurological:      General: No focal deficit present.      Mental Status: She is alert and  oriented to person, place, and time.   Psychiatric:         Mood and Affect: Mood normal.         ECOG SCORE            Assessment/Plan:   Iron deficiency anemia   Iron deficiency: Refractory to prenatal and oral iron   Now status post 1 dose of IV iron with a hemoglobin improved  She did have a reaction to the Injectafer and a 2nd reaction  On 2nd dose despite premedications      March 26, 2025 hematocrit 34 MCV 89: Ferritin 71, iron 51, saturation 18%, normal TIBC, normal IPC      2.Dysfunctional uterine bleeding  Low von Willebrand's   Normal platelet aggregation studies, waxing and waning von Willebrand's factors normal multimers.  Elevated PTT, no evidence of lupus anticoagulant  Now status post endometrial ablation    PLAN:  Oral iron not tolerated  Reaction to injectafer  S/P infed on 6/13/24  SIron rich diet   Continue follow-up with gyn to control her cycles    Consider [facility] lab GN PLT //TEG        and repeat von Willebrand's  if she comes off of progesterone or if she has significant bleeding  Return to clinic in 6 months with CBC iron panel ferritin    Follow up in about 6 months (around 9/28/2025) for with cbc/ iron panel.      Henrik Martínez MD

## 2025-03-28 ENCOUNTER — OFFICE VISIT (OUTPATIENT)
Dept: HEMATOLOGY/ONCOLOGY | Facility: CLINIC | Age: 25
End: 2025-03-28
Payer: COMMERCIAL

## 2025-03-28 DIAGNOSIS — D50.9 IRON DEFICIENCY ANEMIA, UNSPECIFIED IRON DEFICIENCY ANEMIA TYPE: Primary | ICD-10-CM

## 2025-03-28 DIAGNOSIS — R79.1 ABNORMAL PARTIAL THROMBOPLASTIN TIME (PTT): ICD-10-CM

## 2025-03-28 DIAGNOSIS — D50.8 OTHER IRON DEFICIENCY ANEMIA: ICD-10-CM

## 2025-06-10 ENCOUNTER — PATIENT MESSAGE (OUTPATIENT)
Dept: HEMATOLOGY/ONCOLOGY | Facility: CLINIC | Age: 25
End: 2025-06-10
Payer: COMMERCIAL

## 2025-06-11 ENCOUNTER — PATIENT MESSAGE (OUTPATIENT)
Dept: HEMATOLOGY/ONCOLOGY | Facility: CLINIC | Age: 25
End: 2025-06-11
Payer: COMMERCIAL

## 2025-07-07 DIAGNOSIS — R11.2 INCREASED NAUSEA AND VOMITING: ICD-10-CM

## 2025-07-21 ENCOUNTER — OFFICE VISIT (OUTPATIENT)
Dept: HEMATOLOGY/ONCOLOGY | Facility: CLINIC | Age: 25
End: 2025-07-21
Payer: MEDICAID

## 2025-07-21 DIAGNOSIS — D50.9 IRON DEFICIENCY ANEMIA, UNSPECIFIED IRON DEFICIENCY ANEMIA TYPE: Primary | ICD-10-CM

## 2025-07-21 DIAGNOSIS — N93.8 DYSFUNCTIONAL UTERINE BLEEDING: ICD-10-CM

## 2025-07-21 PROCEDURE — 1159F MED LIST DOCD IN RCRD: CPT | Mod: CPTII,93,, | Performed by: NURSE PRACTITIONER

## 2025-07-21 PROCEDURE — 1160F RVW MEDS BY RX/DR IN RCRD: CPT | Mod: CPTII,93,, | Performed by: NURSE PRACTITIONER

## 2025-07-21 PROCEDURE — 98012 SYNCH AUDIO-ONLY EST SF 10: CPT | Mod: 93,,, | Performed by: NURSE PRACTITIONER

## 2025-07-21 RX ORDER — HEPARIN 100 UNIT/ML
500 SYRINGE INTRAVENOUS
OUTPATIENT
Start: 2025-07-30

## 2025-07-21 RX ORDER — ACETAMINOPHEN 325 MG/1
650 TABLET ORAL
OUTPATIENT
Start: 2025-07-30 | End: 2025-07-30

## 2025-07-21 RX ORDER — SODIUM CHLORIDE 0.9 % (FLUSH) 0.9 %
10 SYRINGE (ML) INJECTION
OUTPATIENT
Start: 2025-07-30

## 2025-07-21 RX ORDER — EPINEPHRINE 0.3 MG/.3ML
0.3 INJECTION SUBCUTANEOUS ONCE AS NEEDED
OUTPATIENT
Start: 2025-07-30

## 2025-07-21 RX ORDER — DIPHENHYDRAMINE HYDROCHLORIDE 50 MG/ML
25 INJECTION, SOLUTION INTRAMUSCULAR; INTRAVENOUS
Start: 2025-07-30

## 2025-07-21 NOTE — PROGRESS NOTES
Subjective:        PATIENT: Ceci Abbott  MRN: [id number]  DATE: 2025  Chief Complaint: Results    Referring group:  Nani Antony  Reason for referral anemia     Current Therapy: Iron dextran, last dose in 2024    Clinical History:  This is a 22-year-old female who was seen by her primary care team  On laboratory review on 2022 patients have an iron of 27 saturation of 7, a ferritin of 4.8, her hemoglobin was 10, the platelet count of 390, white cell count of 10.  She is noted to have a normal BUN and creatinine.  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  She has dysfunctional uterine bleeding where she is been through multiple different options.  She continues to have PICA for ice, she has chronic heartburn and reflux.  In fact she had a scope when she was younger because of chronic reflux.    She is scheduled to see Gastroenterology.    She continues to breastfeed.  (the baby is 16 months)  Status post 1 dose of IV iron, attempted 2nd dose of IV iron continued to have chest pain and shortness of breath, hemoglobin improved after 1 dose,    2025  Audio Only Telehealth Visit     The patient location is: home  Visit type: Virtual visit with audio only (telephone)  Total time spent in medical discussion with patient: 13 minutes  Total time spent on date of the encounter: 17 minutes       The reason for the audio only service rather than synchronous audio and video virtual visit was related to technical difficulties or patient preference/necessity.       Each patient to whom I provide medical services by telemedicine is:  (1) informed of the relationship between the physician and patient and the respective role of any other health care provider with respect  to management of the patient; and (2) notified that they may decline to receive medical services by telemedicine and may withdraw from such care at any time. Patient verbally consented to receive this service via voice-only telephone call.       This service was not originating from a related E/M service provided within the previous 7 days nor will  to an E/M service or procedure within the next 24 hours or my soonest available appointment.  Prevailing standard of care was able to be met in this audio-only visit.      Since our last visit the patient was had an endometrial ablation.  She was not responded to TXA or endocrine therapy from her hormonal standpoint or desmopressin.    She continues on progesterone.  She feels well.  She denies any PICA for ice.  Her energy is well.  We reviewed her labs with her today as stated above      Past Medical History:   Diagnosis Date    Colicky RUQ abdominal pain     Depression     Endometriosis, unspecified     Hearing loss in left ear     Nausea vomiting and diarrhea       Past Surgical History:   Procedure Laterality Date    ADENOIDECTOMY      CERVICAL POLYPECTOMY  2024     SECTION      x2    eardrum reconstruction      SINUS SURGERY      TONSILLECTOMY        Family History   Problem Relation Name Age of Onset    Cancer Mother Leanne     Depression Mother Leanne     Hypertension Mother Leanne     Kidney disease Mother Leanne     Miscarriages / Stillbirths Mother Leanne     Arthritis Father Walter     Hearing loss Maternal Grandfather Lester     Heart disease Maternal Grandfather Lester     Hypertension Maternal Grandfather Lester     Arthritis Maternal Grandmother Annabella     Diabetes Maternal Grandmother Annabella     Drug abuse Maternal Grandmother Annabella       Social History     Socioeconomic History    Marital status:    Tobacco Use    Smoking status: Never    Smokeless tobacco: Never   Substance and Sexual Activity    Alcohol use: Never    Drug use: Never     Sexual activity: Yes     Partners: Male     Birth control/protection: OCP      Review of patient's allergies indicates:   Allergen Reactions    Injectafer [ferric carboxymaltose] Shortness Of Breath and Palpitations    Shellfish containing products      Current Outpatient Medications:     dextroamphetamine-amphetamine (ADDERALL XR) 20 MG 24 hr capsule, Take by mouth daily as needed., Disp: , Rfl:     hyoscyamine 0.125 mg Subl, Place 1 tablet (0.125 mg total) under the tongue every 6 (six) hours as needed (as needed abdominal pain)., Disp: 100 tablet, Rfl: 1    metoclopramide HCl (REGLAN) 10 MG tablet, Take 1 tablet (10 mg total) by mouth every 6 (six) hours as needed., Disp: 60 tablet, Rfl: 3    norethindrone (MICRONOR) 0.35 mg tablet, Take 1 tablet by mouth once daily., Disp: , Rfl:     pantoprazole (PROTONIX) 40 MG tablet, Take 1 tablet (40 mg total) by mouth once daily., Disp: 90 tablet, Rfl: 3    promethazine (PHENERGAN) 25 MG tablet, Take 1 tablet (25 mg total) by mouth every 4 (four) hours., Disp: 30 tablet, Rfl: 0    propranoloL (INDERAL) 10 MG tablet, Take 10 mg by mouth once daily., Disp: , Rfl:     sertraline (ZOLOFT) 100 MG tablet, Take 100 mg by mouth once daily., Disp: , Rfl:    Review of Systems   Constitutional:  Negative for appetite change and unexpected weight change.   HENT:  Negative for mouth sores.    Eyes:  Negative for visual disturbance.   Respiratory:  Positive for shortness of breath. Negative for cough.    Cardiovascular:  Negative for chest pain.   Gastrointestinal:  Negative for abdominal pain and diarrhea.   Genitourinary:  Negative for frequency.   Musculoskeletal:  Negative for back pain.   Skin:  Negative for rash.   Neurological:  Negative for headaches.   Hematological:  Negative for adenopathy.   Psychiatric/Behavioral:  The patient is not nervous/anxious.         Objective:     There were no vitals filed for this visit.     Physical Exam    ECOG SCORE            Assessment/Plan:    Dx: Iron deficiency anemia   Refractory to prenatal and oral iron   Reaction to injectafer  S/P Infed 12/2024 March 26, 2025 hematocrit 34 MCV 89: Ferritin 71, iron 51, saturation 18%, normal TIBC, normal IPC      Dx: Dysfunctional uterine bleeding  Low von Willebrand's   Normal platelet aggregation studies, waxing and waning von Willebrand's factors normal multimers.  Elevated PTT, no evidence of lupus anticoagulant  Now status post endometrial ablation 2/2025 with no improvement in menstrual cycles    PLAN:  Set up for IV iron  Iron rich diet   Continue follow-up with gyn to control her cycles -considering hysterectomy   Consider [facility] lab GN PLT //TEG        and repeat von Willebrand's  if she comes off of progesterone or if she has significant bleeding    Follow up in about 8 weeks (around 9/15/2025) for Audio visit, with labs the day before.

## 2025-07-30 ENCOUNTER — INFUSION (OUTPATIENT)
Dept: INFUSION THERAPY | Facility: HOSPITAL | Age: 25
End: 2025-07-30
Attending: INTERNAL MEDICINE
Payer: MEDICAID

## 2025-07-30 VITALS
DIASTOLIC BLOOD PRESSURE: 79 MMHG | TEMPERATURE: 98 F | SYSTOLIC BLOOD PRESSURE: 130 MMHG | HEART RATE: 71 BPM | RESPIRATION RATE: 18 BRPM

## 2025-07-30 DIAGNOSIS — D50.0 IRON DEFICIENCY ANEMIA DUE TO CHRONIC BLOOD LOSS: Primary | ICD-10-CM

## 2025-07-30 PROCEDURE — 96376 TX/PRO/DX INJ SAME DRUG ADON: CPT

## 2025-07-30 PROCEDURE — 63600175 PHARM REV CODE 636 W HCPCS: Performed by: NURSE PRACTITIONER

## 2025-07-30 PROCEDURE — 25000003 PHARM REV CODE 250: Performed by: NURSE PRACTITIONER

## 2025-07-30 PROCEDURE — 96375 TX/PRO/DX INJ NEW DRUG ADDON: CPT

## 2025-07-30 PROCEDURE — 96365 THER/PROPH/DIAG IV INF INIT: CPT

## 2025-07-30 PROCEDURE — 63600175 PHARM REV CODE 636 W HCPCS: Mod: JW,TB | Performed by: NURSE PRACTITIONER

## 2025-07-30 RX ORDER — DIPHENHYDRAMINE HYDROCHLORIDE 50 MG/ML
25 INJECTION, SOLUTION INTRAMUSCULAR; INTRAVENOUS
Status: CANCELLED
Start: 2025-07-30

## 2025-07-30 RX ORDER — ACETAMINOPHEN 325 MG/1
650 TABLET ORAL
Status: COMPLETED | OUTPATIENT
Start: 2025-07-30 | End: 2025-07-30

## 2025-07-30 RX ORDER — ACETAMINOPHEN 325 MG/1
650 TABLET ORAL
Status: CANCELLED | OUTPATIENT
Start: 2025-07-30 | End: 2025-07-30

## 2025-07-30 RX ORDER — EPINEPHRINE 0.3 MG/.3ML
0.3 INJECTION SUBCUTANEOUS ONCE AS NEEDED
Status: DISCONTINUED | OUTPATIENT
Start: 2025-07-30 | End: 2025-07-30 | Stop reason: HOSPADM

## 2025-07-30 RX ORDER — HEPARIN 100 UNIT/ML
500 SYRINGE INTRAVENOUS
Status: CANCELLED | OUTPATIENT
Start: 2025-07-30

## 2025-07-30 RX ORDER — DIPHENHYDRAMINE HYDROCHLORIDE 50 MG/ML
25 INJECTION, SOLUTION INTRAMUSCULAR; INTRAVENOUS
Status: COMPLETED | OUTPATIENT
Start: 2025-07-30 | End: 2025-07-30

## 2025-07-30 RX ORDER — EPINEPHRINE 0.3 MG/.3ML
0.3 INJECTION SUBCUTANEOUS ONCE AS NEEDED
Status: CANCELLED | OUTPATIENT
Start: 2025-07-30

## 2025-07-30 RX ORDER — SODIUM CHLORIDE 0.9 % (FLUSH) 0.9 %
10 SYRINGE (ML) INJECTION
Status: DISCONTINUED | OUTPATIENT
Start: 2025-07-30 | End: 2025-07-30 | Stop reason: HOSPADM

## 2025-07-30 RX ORDER — HEPARIN 100 UNIT/ML
500 SYRINGE INTRAVENOUS
Status: DISCONTINUED | OUTPATIENT
Start: 2025-07-30 | End: 2025-07-30 | Stop reason: HOSPADM

## 2025-07-30 RX ORDER — DIPHENHYDRAMINE HYDROCHLORIDE 50 MG/ML
25 INJECTION, SOLUTION INTRAMUSCULAR; INTRAVENOUS ONCE
Status: COMPLETED | OUTPATIENT
Start: 2025-07-30 | End: 2025-07-30

## 2025-07-30 RX ORDER — SODIUM CHLORIDE 0.9 % (FLUSH) 0.9 %
10 SYRINGE (ML) INJECTION
Status: CANCELLED | OUTPATIENT
Start: 2025-07-30

## 2025-07-30 RX ADMIN — ACETAMINOPHEN 650 MG: 325 TABLET ORAL at 10:07

## 2025-07-30 RX ADMIN — IRON DEXTRAN 25 MG: 50 INJECTION INTRAMUSCULAR; INTRAVENOUS at 10:07

## 2025-07-30 RX ADMIN — DIPHENHYDRAMINE HYDROCHLORIDE 25 MG: 50 INJECTION INTRAMUSCULAR; INTRAVENOUS at 10:07

## 2025-07-30 RX ADMIN — DIPHENHYDRAMINE HYDROCHLORIDE 25 MG: 50 INJECTION INTRAMUSCULAR; INTRAVENOUS at 11:07

## 2025-07-30 RX ADMIN — SODIUM CHLORIDE 975 MG: 0.9 INJECTION, SOLUTION INTRAVENOUS at 11:07

## 2025-07-31 ENCOUNTER — PATIENT MESSAGE (OUTPATIENT)
Dept: HEMATOLOGY/ONCOLOGY | Facility: CLINIC | Age: 25
End: 2025-07-31
Payer: MEDICAID

## 2025-07-31 RX ORDER — PROMETHAZINE HYDROCHLORIDE 25 MG/1
25 TABLET ORAL EVERY 6 HOURS PRN
Qty: 30 TABLET | Refills: 3 | Status: SHIPPED | OUTPATIENT
Start: 2025-07-31